# Patient Record
Sex: FEMALE | Race: WHITE | URBAN - METROPOLITAN AREA
[De-identification: names, ages, dates, MRNs, and addresses within clinical notes are randomized per-mention and may not be internally consistent; named-entity substitution may affect disease eponyms.]

---

## 2017-01-09 ENCOUNTER — MEDICAL CORRESPONDENCE (OUTPATIENT)
Dept: HEALTH INFORMATION MANAGEMENT | Facility: CLINIC | Age: 27
End: 2017-01-09

## 2017-08-03 ENCOUNTER — TRANSFERRED RECORDS (OUTPATIENT)
Dept: HEALTH INFORMATION MANAGEMENT | Facility: CLINIC | Age: 27
End: 2017-08-03

## 2017-08-03 DIAGNOSIS — N64.52 BILATERAL NIPPLE DISCHARGE: Primary | ICD-10-CM

## 2017-08-10 DIAGNOSIS — E22.1 HYPERPROLACTINEMIA (H): Primary | ICD-10-CM

## 2017-12-30 ENCOUNTER — RADIANT APPOINTMENT (OUTPATIENT)
Dept: MRI IMAGING | Facility: CLINIC | Age: 27
End: 2017-12-30
Attending: ADVANCED PRACTICE MIDWIFE
Payer: COMMERCIAL

## 2017-12-30 DIAGNOSIS — E22.1 HYPERPROLACTINEMIA (H): ICD-10-CM

## 2017-12-30 RX ORDER — GADOBUTROL 604.72 MG/ML
10 INJECTION INTRAVENOUS ONCE
Status: COMPLETED | OUTPATIENT
Start: 2017-12-30 | End: 2017-12-30

## 2017-12-30 RX ADMIN — GADOBUTROL 9 ML: 604.72 INJECTION INTRAVENOUS at 07:28

## 2017-12-30 NOTE — DISCHARGE INSTRUCTIONS
MRI Contrast Discharge Instructions    The IV contrast you received today will pass out of your body in your  urine. This will happen in the next 24 hours. You will not feel this process.  Your urine will not change color.    Drink at least 4 extra glasses of water or juice today (unless your doctor  has restricted your fluids). This reduces the stress on your kidneys.  You may take your regular medicines.    If you are on dialysis: It is best to have dialysis today.    If you have a reaction: Most reactions happen right away. If you have  any new symptoms after leaving the hospital (such as hives or swelling),  call your hospital at the correct number below. Or call your family doctor.  If you have breathing distress or wheezing, call 911.    Special instructions: ***    I have read and understand the above information.    Signature:______________________________________ Date:___________    Staff:__________________________________________ Date:___________     Time:__________    [facility] Departments:    ___[place]: [phone]  ___[facility]: [phone]  ___[place]: [phone] ___[facility]: [phone]  ___[facility]: [phone]  ___[facility]: [phone]  ___[phone]  ___[facility]: [phone]  ___[phone]

## 2018-01-09 ENCOUNTER — MEDICAL CORRESPONDENCE (OUTPATIENT)
Dept: HEALTH INFORMATION MANAGEMENT | Facility: CLINIC | Age: 28
End: 2018-01-09

## 2018-01-09 ENCOUNTER — TRANSFERRED RECORDS (OUTPATIENT)
Dept: HEALTH INFORMATION MANAGEMENT | Facility: CLINIC | Age: 28
End: 2018-01-09

## 2018-01-09 ENCOUNTER — TELEPHONE (OUTPATIENT)
Dept: OPHTHALMOLOGY | Facility: CLINIC | Age: 28
End: 2018-01-09

## 2018-01-09 DIAGNOSIS — D49.7 PITUITARY TUMOR: ICD-10-CM

## 2018-01-09 DIAGNOSIS — D49.7 PITUITARY TUMOR: Primary | ICD-10-CM

## 2018-01-09 LAB
ANION GAP SERPL CALCULATED.3IONS-SCNC: 7 MMOL/L (ref 3–14)
BUN SERPL-MCNC: 14 MG/DL (ref 7–30)
CALCIUM SERPL-MCNC: 8.5 MG/DL (ref 8.5–10.1)
CHLORIDE SERPL-SCNC: 104 MMOL/L (ref 94–109)
CO2 SERPL-SCNC: 29 MMOL/L (ref 20–32)
CORTIS SERPL-MCNC: 3.5 UG/DL (ref 4–22)
CREAT SERPL-MCNC: 0.78 MG/DL (ref 0.52–1.04)
FSH SERPL-ACNC: 5 IU/L
GFR SERPL CREATININE-BSD FRML MDRD: 89 ML/MIN/1.7M2
GLUCOSE SERPL-MCNC: 93 MG/DL (ref 70–99)
LH SERPL-ACNC: 4.5 IU/L
POTASSIUM SERPL-SCNC: 3.7 MMOL/L (ref 3.4–5.3)
PROLACTIN SERPL-MCNC: 47 UG/L (ref 3–27)
SODIUM SERPL-SCNC: 140 MMOL/L (ref 133–144)
T4 FREE SERPL-MCNC: 0.79 NG/DL (ref 0.76–1.46)
TSH SERPL DL<=0.005 MIU/L-ACNC: 3.08 MU/L (ref 0.4–4)

## 2018-01-09 NOTE — TELEPHONE ENCOUNTER
----- Message from Steph Zamora sent at 1/9/2018 11:26 AM CST -----  Regarding: New Referral For Optic Mass  Contact: [phone]  [facility] is referring patient for an Optic Mass. Records are being faxed to you in clinic.    [facility] is requesting pt be contacted at [phone] for an appointment.    Thanks,  Steph

## 2018-01-09 NOTE — TELEPHONE ENCOUNTER
Spoke to pt  Diagnosed with pituitary tumor at [facility]   Pt being referred to neurosurgery and ophthalmology    Scheduled first available neuro-ophthalmology February 6th with Dr. Wan    No visual acuity changes and no peripheral vision changes noted by pt    Notes not available to triage at time of call.    MRI has been done per pt-- done at [facility]    Note to dr. Wan to review if needs sooner neuro appt or comprehensive evaluation prior to neuro surgery appt for baseline exam with Visual field, color vision possible imaging  No neuro-surgery appt set up as of today  Pt has direct triage number for further assistance    Reuben Ahuja RN 2:49 PM 01/09/18

## 2018-01-10 ENCOUNTER — TELEPHONE (OUTPATIENT)
Dept: ENDOCRINOLOGY | Facility: CLINIC | Age: 28
End: 2018-01-10

## 2018-01-10 ENCOUNTER — OFFICE VISIT (OUTPATIENT)
Dept: ENDOCRINOLOGY | Facility: CLINIC | Age: 28
End: 2018-01-10
Payer: COMMERCIAL

## 2018-01-10 ENCOUNTER — TELEPHONE (OUTPATIENT)
Dept: OPHTHALMOLOGY | Facility: CLINIC | Age: 28
End: 2018-01-10

## 2018-01-10 VITALS
SYSTOLIC BLOOD PRESSURE: 116 MMHG | DIASTOLIC BLOOD PRESSURE: 79 MMHG | WEIGHT: 196.6 LBS | BODY MASS INDEX: 31.6 KG/M2 | HEART RATE: 60 BPM | HEIGHT: 66 IN

## 2018-01-10 DIAGNOSIS — E23.6 MASS OF PITUITARY (H): Primary | ICD-10-CM

## 2018-01-10 LAB
ACTH PLAS-MCNC: 51 PG/ML
GH SERPL-MCNC: 0.5 UG/L (ref 0–8)

## 2018-01-10 RX ORDER — MULTIPLE VITAMINS W/ MINERALS TAB 9MG-400MCG
1 TAB ORAL DAILY
COMMUNITY
End: 2018-07-17

## 2018-01-10 ASSESSMENT — PAIN SCALES - GENERAL: PAINLEVEL: NO PAIN (0)

## 2018-01-10 NOTE — PROGRESS NOTES
Endocrinology and Diabetes Clinic Initial Visit  Date of Service: January 10, 2018    Consulting provider: Dr. Dixon    Reason for consultation: Sellar Mass with Galactorrhea     Subjective:   Aurea Prasad is a 27 year old F with no significant PMH who presents for initial evaluation of galactorrhea, an elevated Prolactin level to 47, and MRI with evidence of a 1.8cm T1 and T2 hyperintense sellar mass with evidence of mass effect on the optic chiasm.    She was initially seen by a midwife at [facility] clinic for IUD placement in July of 2017 when she told provider that she had bilateral milk discharge from nipples for about 7 months since early January. Thought it was related to improper use of her NuvaRing. Was sent for breast ultrasound which was unremarkable and prolactin level was drawn and noted to be in the 50s. Midwife ordered an MRI brain. However the patient was lost to follow up for some months despite phone calls but represented in December 2017 for her MRI which was completed on 12/30/17 with evidence of a 1.8cm sellar mass with concern for mass effect on the optic chiasm.     The patient notes that since August did not have a menstrual period till December and then again in January. Previously was having q week menstrual periods with 3-4 days of bleeding. Did gain about 15 lbs around Jan 2017 but has had a stable weight since then. Appetite is stable. Energy mildly low but notes that her depression is also somewhat worse over the past year. Able to still play volleyball and walk up stairs. No excessive somnolence. No headaches. No visual changes - able to drive and play volleyball without trouble. Maybe some decreased night vision over the past year. No palpitations, cold or heat intolerace. No excessive thirst, no skin changes. No cp or sob. Endorses galactorrhea and irregular menstrual cycles.   Has never been pregnant and has no current plans. IUD in place. Sexually active. Does endorse  "occasional cocaine use for the past 1.5 years or so. No IVDU ever.     Current Problem List:   Patient Active Problem List   Diagnosis     Bilateral nipple discharge       Past Medical and Past Surgical History:  MDD not on medication    No significant PSH     Medications:   Per patient Copper IUD  Vit D  Vit E  Multivitamin    No herbal supplements    Allergies:   NKDA    Social History:  Denies tobacco use. Endorses 5-6 alcoholic drinks q week. Cocaine snorted about 1x/month for the past 1.5 years.     Family History:  Half brother with Psoriasis and Bipolar Disease. Otherwise negative. No endocrine disease in the family including DM.     Review of Systems:  A 10-point ROS is otherwise negative except as noted in HPI.     Physical Examination:  Vital signs:   /79  Pulse 60  Ht 1.676 m (5' 6\")  Wt 89.2 kg (196 lb 9.6 oz)  BMI 31.73 kg/m2  Estimated body mass index is 31.73 kg/(m^2) as calculated from the following:    Height as of this encounter: 1.676 m (5' 6\").    Weight as of this encounter: 89.2 kg (196 lb 9.6 oz).  GEN: generally, well appearing.  HEENT: EOMI. PERRLA; Visual fields including the periphery intact  NECK: Thyroid non-palpable, non-tender. No cervical or clavicular LAD.   CV: RRR with no murmur.   RESP: CTA bilaterally.   CHEST: Easily expressible milky discharge from bilateral nipples.   ABD: Soft, non-tender, and non-distended, with normal BS.   EXT: No peripheral edema.   SKIN: Normal skin temperature and turgor with no lesions.   NEURO: Non-focal. NO tremor. Normal reflexes.   PSYCH; Denies SI/HI/AH/VH    Labs and Studies:     Ref. Range 1/9/2018 19:00 1/9/2018 19:01 1/12/2018 07:30 1/12/2018 07:31   Sodium Latest Ref Range: 133 - 144 mmol/L  140     Potassium Latest Ref Range: 3.4 - 5.3 mmol/L  3.7     Chloride Latest Ref Range: 94 - 109 mmol/L  104     Carbon Dioxide Latest Ref Range: 20 - 32 mmol/L  29     Urea Nitrogen Latest Ref Range: 7 - 30 mg/dL  14     Creatinine Latest Ref " Range: 0.52 - 1.04 mg/dL  0.78     GFR Estimate Latest Ref Range: >60 mL/min/1.7m2  89     GFR Estimate If Black Latest Ref Range: >60 mL/min/1.7m2  >90     Calcium Latest Ref Range: 8.5 - 10.1 mg/dL  8.5     Anion Gap Latest Ref Range: 3 - 14 mmol/L  7     Adrenal Corticotropin Latest Ref Range: <47 pg/mL  51 (H)     Cortisol Serum Latest Ref Range: 4 - 22 ug/dL 3.5 (L)  11.6    FSH Latest Units: IU/L  5.0     Growth Hormone Latest Ref Range: 0 - 8.0 ug/L  0.5     Prolactin Latest Ref Range: 3 - 27 ug/L  47 (H)  47 (H)   T4 Free Latest Ref Range: 0.76 - 1.46 ng/dL  0.79     TSH Latest Ref Range: 0.40 - 4.00 mU/L  3.08     Glucose Latest Ref Range: 70 - 99 mg/dL  93     Ins Growth Factor 1 Latest Ref Range: 98 - 305 ng/ml  210     Lutropin Latest Units: IU/L  4.5       Imaging  MR Brain 12/30/17  Impression: T1 and T2 hyperintense sellar mass with suprasellar  extension measuring up to 1.8 cm in maximum height with mass effect on  the optic chiasm. There is adjacent enhancement on the left which  could represent residual pituitary gland versus other etiology. 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      Assessment:  26 y/o F presenting with 1-year history of bilateral galactorrhea with evidence of 1.8cm T1 and T2 hyperintense sellar mass on MRI with evidence of mass effect on optic chiasm with mildly elevated prolactin likely 2/2 stalk effect.     This is less likely to be a prolactinoma given the mildly elevated prolactin in contrast to the large sellar mass. Mild elevation of prolactin likely represents hook effect - ie her prolactin is so high that the assay is saturated and reads a low prolactin level. To rule this out, we will obtain a prolactin dilutional study which it did confirm the similar result.     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 Reviewed film with  neurosurgeon, . Her HPA axis is largely preserved at this time with the exception of the mildly elevated prolactin, and a slightly low cortisol at 3.5 and high ACTH at 51. She has no clinical symptoms of adrenal insufficiency and this lab was drawn at 7pm. We can repeat this lab at 8am in the next couple days to decide if we will need to pursue further workup with a cosyntropin stim etc. The most concerning feature is the mass effect seen on the optic chiasm. She does not have any visual field deficits. She will be seeing neuro ophthalmology on February 6. We will refer her to meet with our pituitary surgeon, Dr. Oshea.      Plan:   1) Recheck ACTH and Cortisol in the AM along with diluted Prolactin Level to rule out Hook Effect  2) Neurosurgery consultation with Dr. Oshea  3) Neuro-ophthalmologic evaluation    Patient was seen and examined with attending physician, Dr. Christiano Ha    Addendum:  Repeat prolactin level -- no hook effect   AM cortisol is 11.6 -- unlikely for adrenal insufficiency. Will not pursue with stim test    Brittno Dixon MD  IM PGY2  [phone]     ATTENDING NOTE   I have seen and examined the patient, reviewed and edited the resident's note, and agree with the plan of care.    Christiano Ha MD    Division of Diabetes and Endocrinology  Department of Medicine  [phone]

## 2018-01-10 NOTE — TELEPHONE ENCOUNTER
----- Message from Ricardo Jewell MD sent at 1/9/2018  9:46 PM CST -----  Regarding: RE: Scheduling?  I got a call from our resident who saw her in primary care clinic today. She has galactorrhea and MRI showed pituitary adenoma with optic chiasm compression and will refer to our clinic. I believed she also got urgent neurosurgery and ophtho referral.  I recommend labs and so we decided to have she has it done here. Her cortisol return low. Could you schedule her for Wednesday 1/10/18 anytime from 1 pm to 3 pm to see as an add-on with me and Dr. Alvarado?    Thank you,    Ricardo     ----- Message -----     From: Marielena Rapp     Sent: 1/9/2018   1:13 PM       To: Ricardo Jewell MD  Subject: Scheduling?                                      PADMAJA Silva received records on this patient today. I see that you have ordered labs for her. Is she aware that she needs this blood draw, and do we need to get her scheduled with Dr. Aldana for a pituitary consult? Please advise.     Marielena

## 2018-01-10 NOTE — TELEPHONE ENCOUNTER
----- Message from Francisco Sanderson sent at 1/10/2018  1:27 PM CST -----  Regarding: Guillermo, from [facility] called  Contact: [phone]  Guillermo (Female) called asking if we'd scheduled an appt for pt. Wasn't sure with which provider.    I read the notes in Epic, and saw that Nurse Reuben had made the appts. Guillermo asked if Nurse Reuben had spoken w/ Pt when making appt.    Please call Guillermo to discuss at [phone]    Thank you,  Aaron  Call Center    Please DO NOT send message and or reply back to sender. Call center Representatives DO NOT respond to Messages.  
Left message with [facility] referral  I did speak with pt yesterday  Pt has appt February 6th with dr. Wan  Pt has direct triage number if needs sooner appt per neuro surgery/endocrinology  Note was sent to dr. Wan also  Reuben Ahuja RN 1:57 PM 01/10/18      
Myself

## 2018-01-10 NOTE — TELEPHONE ENCOUNTER
To schedulers: Please schedule her for consult service as directed below.   Tasia Araya MD  Endocrine triage

## 2018-01-10 NOTE — PROGRESS NOTES
Got a call from PCP this morning about pt 26 yo female with galactorrhea and newly diagnosed 1.8 cm sellar mass and optic nerve compression. Prolactin was 56 and TSH was normal in August. She is also getting urgent ophthalmology and neurosurgery consult.    Discussed with Dr. Alvarado, agree with urgent ophthalmology consult and we recommended getting pituitary hormone and have her seen in our clinic. After discuss with calling physician and the patient, preferred to have [place] [facility]    Some of her pituitary labs return including cortisol of 3.5. I called the patient and let her know that we would like to see her in clinic tomorrow. Pt is feeling tired but no dizziness/ nausea/ vomiting. No fever/ no acute illness. No symptoms that change from her baseline. Her BP yesterday was 113/63 per her report.  She is available at 3 pm. I also talked to her over the phone briefly about cortisol and let her know that it is a stress hormone which she may need replacement and just in case if tonight she feels poor/ tired than usual/ dizziness/ nausea/ vomiting or different than her baseline, she need to seek medical attention right away.    Ricardo Jewell MD  Endocrine Fellow  [phone]

## 2018-01-10 NOTE — LETTER
1/10/2018       RE: Aurea Prasad  [address] [address]     Dear Colleague,    Thank you for referring your patient, Aurea Prasad, to the [facility] ENDOCRINOLOGY at [facility]. Please see a copy of my visit note below.    Endocrinology and Diabetes Clinic Initial Visit  Date of Service: January 10, 2018    Consulting provider: Dr. Dixon    Reason for consultation: Sellar Mass with Galactorrhea     Subjective:   Aurea Prasad is a 27 year old F with no significant PMH who presents for initial evaluation of galactorrhea, an elevated Prolactin level to 47, and MRI with evidence of a 1.8cm T1 and T2 hyperintense sellar mass with evidence of mass effect on the optic chiasm.    She was initially seen by a midwife at [facility] clinic for IUD placement in July of 2017 when she told provider that she had bilateral milk discharge from nipples for about 7 months since early January. Thought it was related to improper use of her NuvaRing. Was sent for breast ultrasound which was unremarkable and prolactin level was drawn and noted to be in the 50s. Midwife ordered an MRI brain. However the patient was lost to follow up for some months despite phone calls but represented in December 2017 for her MRI which was completed on 12/30/17 with evidence of a 1.8cm sellar mass with concern for mass effect on the optic chiasm.     The patient notes that since August did not have a menstrual period till December and then again in January. Previously was having q week menstrual periods with 3-4 days of bleeding. Did gain about 15 lbs around Jan 2017 but has had a stable weight since then. Appetite is stable. Energy mildly low but notes that her depression is also somewhat worse over the past year. Able to still play volleyball and walk up stairs. No excessive somnolence. No headaches. No visual changes - able to drive and play volleyball without trouble. Maybe some decreased  "night vision over the past year. No palpitations, cold or heat intolerace. No excessive thirst, no skin changes. No cp or sob. Endorses galactorrhea and irregular menstrual cycles.   Has never been pregnant and has no current plans. IUD in place. Sexually active. Does endorse occasional cocaine use for the past 1.5 years or so. No IVDU ever.     Current Problem List:   Patient Active Problem List   Diagnosis     Bilateral nipple discharge       Past Medical and Past Surgical History:  MDD not on medication    No significant PSH     Medications:   Per patient Copper IUD  Vit D  Vit E  Multivitamin    No herbal supplements    Allergies:   NKDA    Social History:  Denies tobacco use. Endorses 5-6 alcoholic drinks q week. Cocaine snorted about 1x/month for the past 1.5 years.     Family History:  Half brother with Psoriasis and Bipolar Disease. Otherwise negative. No endocrine disease in the family including DM.     Review of Systems:  A 10-point ROS is otherwise negative except as noted in HPI.     Physical Examination:  Vital signs:   /79  Pulse 60  Ht 1.676 m (5' 6\")  Wt 89.2 kg (196 lb 9.6 oz)  BMI 31.73 kg/m2  Estimated body mass index is 31.73 kg/(m^2) as calculated from the following:    Height as of this encounter: 1.676 m (5' 6\").    Weight as of this encounter: 89.2 kg (196 lb 9.6 oz).  GEN: generally, well appearing.  HEENT: EOMI. PERRLA; Visual fields including the periphery intact  NECK: Thyroid non-palpable, non-tender. No cervical or clavicular LAD.   CV: RRR with no murmur.   RESP: CTA bilaterally.   CHEST: Easily expressible milky discharge from bilateral nipples.   ABD: Soft, non-tender, and non-distended, with normal BS.   EXT: No peripheral edema.   SKIN: Normal skin temperature and turgor with no lesions.   NEURO: Non-focal. NO tremor. Normal reflexes.   PSYCH; Denies SI/HI/AH/VH    Labs and Studies:     Ref. Range 1/9/2018 19:00 1/9/2018 19:01 1/12/2018 07:30 1/12/2018 07:31   Sodium " Latest Ref Range: 133 - 144 mmol/L  140     Potassium Latest Ref Range: 3.4 - 5.3 mmol/L  3.7     Chloride Latest Ref Range: 94 - 109 mmol/L  104     Carbon Dioxide Latest Ref Range: 20 - 32 mmol/L  29     Urea Nitrogen Latest Ref Range: 7 - 30 mg/dL  14     Creatinine Latest Ref Range: 0.52 - 1.04 mg/dL  0.78     GFR Estimate Latest Ref Range: >60 mL/min/1.7m2  89     GFR Estimate If Black Latest Ref Range: >60 mL/min/1.7m2  >90     Calcium Latest Ref Range: 8.5 - 10.1 mg/dL  8.5     Anion Gap Latest Ref Range: 3 - 14 mmol/L  7     Adrenal Corticotropin Latest Ref Range: <47 pg/mL  51 (H)     Cortisol Serum Latest Ref Range: 4 - 22 ug/dL 3.5 (L)  11.6    FSH Latest Units: IU/L  5.0     Growth Hormone Latest Ref Range: 0 - 8.0 ug/L  0.5     Prolactin Latest Ref Range: 3 - 27 ug/L  47 (H)  47 (H)   T4 Free Latest Ref Range: 0.76 - 1.46 ng/dL  0.79     TSH Latest Ref Range: 0.40 - 4.00 mU/L  3.08     Glucose Latest Ref Range: 70 - 99 mg/dL  93     Ins Growth Factor 1 Latest Ref Range: 98 - 305 ng/ml  210     Lutropin Latest Units: IU/L  4.5       Imaging  MR Brain 12/30/17  Impression: T1 and T2 hyperintense sellar mass with suprasellar  extension measuring up to 1.8 cm in maximum height with mass effect on  the optic chiasm. There is adjacent enhancement on the left which  could represent residual pituitary gland versus other etiology. 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      Assessment:  26 y/o F presenting with 1-year history of bilateral galactorrhea with evidence of 1.8cm T1 and T2 hyperintense sellar mass on MRI with evidence of mass effect on optic chiasm with mildly elevated prolactin likely 2/2 stalk effect.     This is less likely to be a prolactinoma given the mildly elevated prolactin in contrast to the large sellar mass. Mild elevation of prolactin likely represents hook effect - ie her prolactin is  so high that the assay is saturated and reads a low prolactin level. To rule this out, we will obtain a prolactin dilutional study which it did confirm the similar result.     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 Reviewed film with neurosurgeon, . Her HPA axis is largely preserved at this time with the exception of the mildly elevated prolactin, and a slightly low cortisol at 3.5 and high ACTH at 51. She has no clinical symptoms of adrenal insufficiency and this lab was drawn at 7pm. We can repeat this lab at 8am in the next couple days to decide if we will need to pursue further workup with a cosyntropin stim etc. The most concerning feature is the mass effect seen on the optic chiasm. She does not have any visual field deficits. She will be seeing neuro ophthalmology on February 6. We will refer her to meet with our pituitary surgeon, Dr. Oshea.      Plan:   1) Recheck ACTH and Cortisol in the AM along with diluted Prolactin Level to rule out Hook Effect  2) Neurosurgery consultation with Dr. Oshea  3) Neuro-ophthalmologic evaluation    Patient was seen and examined with attending physician, Dr. Christiano Ha    Addendum:  Repeat prolactin level -- no hook effect   AM cortisol is 11.6 -- unlikely for adrenal insufficiency. Will not pursue with stim test    Britton Dixon MD  IM PGY2  [phone]     ATTENDING NOTE   I have seen and examined the patient, reviewed and edited the resident's note, and agree with the plan of care.    Christiano Ha MD    Division of Diabetes and Endocrinology  Department of Medicine  [phone]

## 2018-01-10 NOTE — MR AVS SNAPSHOT
After Visit Summary   1/10/2018    Aurea Prasad    MRN: [id number]           Patient Information     Date Of Birth          1990        Visit Information        Provider Department      1/10/2018 3:00 PM Christiano Ha MD [facility] Endocrinology        Care Instructions    Morning labs at 8 AM  Please contact us if any questions [phone]          Follow-ups after your visit        Your next 10 appointments already scheduled     2018  9:30 AM CST   NEW NEURO with Layo Wan MD   Eye Clinic ([facility])    [address]  [address]  9th Fl Clin 9a  [facility] [id number] [phone]              Who to contact     Please call your clinic at [phone] to:    Ask questions about your health    Make or cancel appointments    Discuss your medicines    Learn about your test results    Speak to your doctor   If you have compliments or concerns about an experience at your clinic, or if you wish to file a complaint, please contact [facility] Patient Relations at [phone] or email us at [email or web address]         Additional Information About Your Visit        MyChart Information     BalaBitt is an electronic gateway that provides easy, online access to your medical records. With [employer], you can request a clinic appointment, read your test results, renew a prescription or communicate with your care team.     To sign up for BalaBitt visit the website at [email or web address]   You will be asked to enter the access code listed below, as well as some personal information. Please follow the directions to create your username and password.     Your access code is: [email or web address]  Expires: 3/21/2018  6:30 AM     Your access code will  in 90 days. If you need help or a new code, please contact your [facility] or call [phone] for assistance.        Care EveryWhere ID  "    This is your Care EveryWhere ID. This could be used by other organizations to access your [place] medical records  [id number]        Your Vitals Were     Pulse Height BMI (Body Mass Index)             60 1.676 m (5' 6\") 31.73 kg/m2          Blood Pressure from Last 3 Encounters:   01/10/18 116/79    Weight from Last 3 Encounters:   01/10/18 89.2 kg (196 lb 9.6 oz)              Today, you had the following     No orders found for display       Primary Care Provider Office Phone # Fax #    Clinic [facility] [phone]       [address] [facility] [address]        Equal Access to Services     DONELL GARZON : Hadii damian ku hadasho Sojaquanali, waaxda luqadaha, qaybta kaalmada adeegyada, elidia sierra . So [facility] [phone].    ATENCIÓN: Si habla español, tiene a cheek disposición servicios gratuitos de asistencia lingüística. [facility] [phone].    We comply with applicable federal civil rights laws and [place] laws. We do not discriminate on the basis of race, color, national origin, age, disability, sex, sexual orientation, or gender identity.            Thank you!     Thank you for choosing [facility]  for your care. Our goal is always to provide you with excellent care. Hearing back from our patients is one way we can continue to improve our services. Please take a few minutes to complete the written survey that you may receive in the mail after your visit with us. Thank you!             Your Updated Medication List - Protect others around you: Learn how to safely use, store and throw away your medicines at www.disposemymeds.org.          This list is accurate as of: 1/10/18  4:08 PM.  Always use your most recent med list.                   Brand Name Dispense Instructions for use Diagnosis    Multi-vitamin Tabs tablet      Take 1 tablet by mouth daily          "

## 2018-01-12 ENCOUNTER — OFFICE VISIT (OUTPATIENT)
Dept: NEUROSURGERY | Facility: CLINIC | Age: 28
End: 2018-01-12
Payer: COMMERCIAL

## 2018-01-12 VITALS
DIASTOLIC BLOOD PRESSURE: 73 MMHG | HEIGHT: 66 IN | BODY MASS INDEX: 31.39 KG/M2 | HEART RATE: 57 BPM | WEIGHT: 195.3 LBS | SYSTOLIC BLOOD PRESSURE: 118 MMHG

## 2018-01-12 DIAGNOSIS — E23.6 RATHKE'S CLEFT CYST (H): Primary | ICD-10-CM

## 2018-01-12 DIAGNOSIS — E23.6 MASS OF PITUITARY (H): ICD-10-CM

## 2018-01-12 LAB
CORTIS SERPL-MCNC: 11.6 UG/DL (ref 4–22)
IGF-I BLD-MCNC: 210 NG/ML (ref 98–305)
PROLACTIN SERPL-MCNC: 47 UG/L (ref 3–27)

## 2018-01-12 ASSESSMENT — ENCOUNTER SYMPTOMS
SORE THROAT: 0
HOT FLASHES: 0
HOARSE VOICE: 1
MYALGIAS: 1
SINUS CONGESTION: 1
SLEEP DISTURBANCES DUE TO BREATHING: 0
BACK PAIN: 1
LIGHT-HEADEDNESS: 0
SINUS PAIN: 0
TROUBLE SWALLOWING: 0
HYPOTENSION: 0
SYNCOPE: 0
LEG PAIN: 0
MUSCLE WEAKNESS: 0
ORTHOPNEA: 0
EXERCISE INTOLERANCE: 0
DECREASED CONCENTRATION: 1
MUSCLE CRAMPS: 1
DECREASED LIBIDO: 0
INSOMNIA: 0
PALPITATIONS: 0
SMELL DISTURBANCE: 0
NECK MASS: 0
STIFFNESS: 0
PANIC: 0
TASTE DISTURBANCE: 0
DEPRESSION: 1
ARTHRALGIAS: 0
NERVOUS/ANXIOUS: 1
JOINT SWELLING: 0
NECK PAIN: 1
HYPERTENSION: 0

## 2018-01-12 ASSESSMENT — PAIN SCALES - GENERAL: PAINLEVEL: NO PAIN (0)

## 2018-01-12 NOTE — LETTER
1/12/2018       RE: Aurea Prasad  [address]  [facility] [address]     Dear Colleague,    Thank you for referring your patient, Aurea Prasad, to the [facility] NEUROSURGERY at [facility]. Please see a copy of my visit note below.    See dictated note.    January 12, 2018            Christiano Ha MD   [facility]    [address] [facility] [address]   [place] [address]      RE:  Aurea Prasad      Dear Dr. Ha:      We saw Ms. Prasad in Pituitary Clinic today for evaluation of a cystic sellar mass.  We have reviewed your notes, and you and I have already discussed her case.  In summary, she is a 27-year-old right-handed woman who presented to you with about 1 year of galactorrhea bilaterally.  You have already detailed these symptoms in your note.  An elevated prolactin level was found which led to an MRI.  This MRI was completed recently and showed a cystic mass with mild mass effect on the optic apparatus.  Her galactorrhea is bilateral and must be expressed.  She had a 5-month interval with no period but then it resumed this month.  She denies any headaches or visual changes.  Her other symptom is fatigue.      Past Medical History:     1.  Closed head injury at age 8 following a sledding collision.     2.  She was hospitalized for dehydration secondary to gastroenteritis a few years ago.   3.  [place] teeth extraction.      Family History:  There is no known history of intracranial tumors.      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  She is single and has no children.  She lives with her brother.  She does not smoke.  She has a few alcohol drinks every week.  According to your note, she has used cocaine in the past.      On exam, her general appearance is healthy.  She is obese with a BMI of 31.5.  Blood pressure is 118/73, heart rate 57.  She appears fatigued but has a normal  affect.  Her speech and language are normal.  Extraocular movements are normal.  There is no ptosis.  Pupils are equal and reactive.  Face is symmetric.  She moves all extremities with good strength and coordination.      Review of Studies:  We went over her MRI with her.  She has a nonenhancing cystic mass in the sella with suprasellar extension.  There is contact with the optic chiasm, but there does not appear to be compression.  The pituitary gland appears displaced superiorly and to the left.  The mass is hyperintense on T1 and the FLAIR sequence.  There is expansion of the floor of the sella.  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  A cystic pituitary adenoma is the other possibility.  Her repeat prolactin level was 47.  Her evening cortisol level was 3.5, and her daytime cortisol level was in the reference range at 11.6.      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  Her mild hyperprolactinemia is secondary to compression of the pituitary stalk.  While the hyperprolactinemia can be treated medically, we should give consideration to surgical drainage/resection of this cyst.      She is young, and with the expected growth of this cyst, we can expect sheyla compression of the optic apparatus eventually.  Therefore, we believe that surgical decompression is best.  This will likely also relieve the hyperprolactinemia.  In the absence of other symptoms, surgery can be performed at any time.  She is scheduled for formal visual testing in about 3 weeks, and we will await those results.  In the meantime, we will go ahead and schedule her for the endoscopic transnasal approach.  We went over the details of surgery including the risks of carotid artery injury, CSF leak, infection, hemorrhage and panhypopituitarism.  Given her young age, our approach will be conservative, and we will focus on drainage  of this cyst with resection of the readily accessible parts of the cyst wall.  However, conservative approach increases the risk of cyst recurrence and need for reoperation.  She is aware of all of this and is leaning towards surgery.  We will arrange for her to meet with one of our ENT Skull Base Surgery partners, and we will begin making arrangements.        We will keep you informed of her progress.         BRI BABB MD             D: 2018 13:27   T: 01/15/2018 08:41   MT: PABLO      Name:     SIERRA BOWER   MRN:      [id number]        Account:      [id number]   :      1990           Service Date: 2018      Document: [id number]       Again, thank you for allowing me to participate in the care of your patient.      Sincerely,    Bri Babb MD

## 2018-01-12 NOTE — MR AVS SNAPSHOT
After Visit Summary   1/12/2018    Aurea Prasad    MRN: [id number]           Patient Information     Date Of Birth          1990        Visit Information        Provider Department      1/12/2018 10:30 AM Maria Luisa Oshea MD [facility] Neurosurgery        Today's Diagnoses     Rathke's cleft cyst (H)    -  1       Follow-ups after your visit        Additional Services     Otolaryngology Referral [2710]       Your provider has referred you to: [facility]: Adult Ear, Nose and Throat Clinic (Otolaryngology) [facility] [phone]  [email or web address]  For Dr Lewis or Yarely - surgical consultation    Please be aware that coverage of these services is subject to the terms and limitations of your health insurance plan.  Call member services at your health plan with any benefit or coverage questions.      Please bring the following with you to your appointment:    (1) Any X-Rays, CTs or MRIs which have been performed.  Contact the facility where they were done to arrange for  prior to your scheduled appointment.   (2) List of current medications  (3) This referral request   (4) Any documents/labs given to you for this referral            PAC Visit Referral (For [facility] Only)       Does this visit require an Anesthesia consult?  Yes - Evaluate for medical necessity related to one of the following conditions:  Age and general health    H&P done by:  N/A and Other (Specify): PAC      Please be aware that coverage of these services is subject to the terms and limitations of your health insurance plan.  Call member services at your health plan with any benefit or coverage questions.      Please bring the following to your appointment:  >>   Any x-rays, CTs or MRIs which have been performed.  Contact the facility where they were done to arrange for  prior to your scheduled appointment.  Any new CT, MRI or other procedures ordered by your  specialist must be performed at a [facility] or coordinated by your clinic's referral office.    >>   List of current medications  >>   This referral request   >>   Any documents/labs given to you for this referral                  Your next 10 appointments already scheduled     2018  9:30 AM CST   NEW NEURO with Layo Wan MD   Eye Clinic ([facility])    [address]  [address] [address]  9th Fl Clin 9a  [facility] [id number] [phone]              Future tests that were ordered for you today     Open Future Orders        Priority Expected Expires Ordered    MRI Brain with & without gadolinium with pituitary protocol [ANY057] Routine  2019    CT Head without contrast [GTG672] Routine  2019            Who to contact     Please call your clinic at [phone] to:    Ask questions about your health    Make or cancel appointments    Discuss your medicines    Learn about your test results    Speak to your doctor   If you have compliments or concerns about an experience at your clinic, or if you wish to file a complaint, please contact [facility] Patient Relations at [phone] or email us at [email or web address]         Additional Information About Your Visit        Matatena GamesharNavita Information     Prot-Ont is an electronic gateway that provides easy, online access to your medical records. With Patient Access [employer], you can request a clinic appointment, read your test results, renew a prescription or communicate with your care team.     To sign up for Prot-Ont visit the website at [email or web address]   You will be asked to enter the access code listed below, as well as some personal information. Please follow the directions to create your username and password.     Your access code is: [email or web address]  Expires: 3/21/2018  6:30 AM     Your access code will  in 90 days. If you need help or a new code, please  "contact your [facility] Clinic or call [phone] for assistance.        Care EveryWhere ID     This is your Care EveryWhere ID. This could be used by other organizations to access your [place] medical records  [id number]        Your Vitals Were     Pulse Height BMI (Body Mass Index)             57 1.676 m (5' 6\") 31.52 kg/m2          Blood Pressure from Last 3 Encounters:   01/12/18 118/73   01/10/18 116/79    Weight from Last 3 Encounters:   01/12/18 88.6 kg (195 lb 4.8 oz)   01/10/18 89.2 kg (196 lb 9.6 oz)              We Performed the Following     Otolaryngology Referral [8528]     PAC Visit Referral (For [facility] Only)     Mavis-Operative Worksheet (Neuro - Single Procedure)        Primary Care Provider Office Phone # Fax #    [facility] [phone]       [address] [address]        Equal Access to Services     DONELL GARZON AH: Hadii aad ku hadasho Soomaali, waaxda luqadaha, qaybta kaalmada adeegyada, waxay idiin hayaan ray kharakasie ladenia . So [facility] [phone].    ATENCIÓN: Si habla español, tiene a cheek disposición servicios gratuitos de asistencia lingüística. Llame al [phone].    We comply with applicable federal civil rights laws and [place] laws. We do not discriminate on the basis of race, color, national origin, age, disability, sex, sexual orientation, or gender identity.            Thank you!     Thank you for choosing [facility] NEUROSURGERY  for your care. Our goal is always to provide you with excellent care. Hearing back from our patients is one way we can continue to improve our services. Please take a few minutes to complete the written survey that you may receive in the mail after your visit with us. Thank you!             Your Updated Medication List - Protect others around you: Learn how to safely use, store and throw away your medicines at www.disposemymeds.org.          This list is accurate as of: 1/12/18  1:28 PM.  " Always use your most recent med list.                   Brand Name Dispense Instructions for use Diagnosis    Multi-vitamin Tabs tablet      Take 1 tablet by mouth daily

## 2018-01-12 NOTE — LETTER
1/12/2018      RE: Aurea LUCIANO Osmar  [address]  [place] [address]       See dictated note.    January 12, 2018            Christiano Ha MD   [facility]    [address]   [place] [address]      RE:  Aurea Prasad      Dear Dr. Ha:      We saw Ms. Prasad in Pituitary Clinic today for evaluation of a cystic sellar mass.  We have reviewed your notes, and you and I have already discussed her case.  In summary, she is a 27-year-old right-handed woman who presented to you with about 1 year of galactorrhea bilaterally.  You have already detailed these symptoms in your note.  An elevated prolactin level was found which led to an MRI.  This MRI was completed recently and showed a cystic mass with mild mass effect on the optic apparatus.  Her galactorrhea is bilateral and must be expressed.  She had a 5-month interval with no period but then it resumed this month.  She denies any headaches or visual changes.  Her other symptom is fatigue.      Past Medical History:     1.  Closed head injury at age 8 following a sledding collision.     2.  She was hospitalized for dehydration secondary to gastroenteritis a few years ago.   3.  [place] teeth extraction.      Family History:  There is no known history of intracranial tumors.      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  She is single and has no children.  She lives with her brother.  She does not smoke.  She has a few alcohol drinks every week.  According to your note, she has used cocaine in the past.      On exam, her general appearance is healthy.  She is obese with a BMI of 31.5.  Blood pressure is 118/73, heart rate 57.  She appears fatigued but has a normal affect.  Her speech and language are normal.  Extraocular movements are normal.  There is no ptosis.  Pupils are equal and reactive.  Face is symmetric.  She moves all extremities with good strength  and coordination.      Review of Studies:  We went over her MRI with her.  She has a nonenhancing cystic mass in the sella with suprasellar extension.  There is contact with the optic chiasm, but there does not appear to be compression.  The pituitary gland appears displaced superiorly and to the left.  The mass is hyperintense on T1 and the FLAIR sequence.  There is expansion of the floor of the sella.  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  A cystic pituitary adenoma is the other possibility.  Her repeat prolactin level was 47.  Her evening cortisol level was 3.5, and her daytime cortisol level was in the reference range at 11.6.      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  Her mild hyperprolactinemia is secondary to compression of the pituitary stalk.  While the hyperprolactinemia can be treated medically, we should give consideration to surgical drainage/resection of this cyst.      She is young, and with the expected growth of this cyst, we can expect sheyla compression of the optic apparatus eventually.  Therefore, we believe that surgical decompression is best.  This will likely also relieve the hyperprolactinemia.  In the absence of other symptoms, surgery can be performed at any time.  She is scheduled for formal visual testing in about 3 weeks, and we will await those results.  In the meantime, we will go ahead and schedule her for the endoscopic transnasal approach.  We went over the details of surgery including the risks of carotid artery injury, CSF leak, infection, hemorrhage and panhypopituitarism.  Given her young age, our approach will be conservative, and we will focus on drainage of this cyst with resection of the readily accessible parts of the cyst wall.  However, conservative approach increases the risk of cyst recurrence and need for reoperation.  She is aware of all of  this and is leaning towards surgery.  We will arrange for her to meet with one of our ENT Skull Base Surgery partners, and we will begin making arrangements.        We will keep you informed of her progress.         BRI BABB MD             D: 2018 13:27   T: 01/15/2018 08:41   MT: PABLO      Name:     SIERRA BOWER   MRN:      [id number]        Account:      [id number]   :      1990           Service Date: 2018      Document: [id number]       Bri Babb MD

## 2018-01-15 LAB — ACTH PLAS-MCNC: 65 PG/ML

## 2018-01-30 DIAGNOSIS — H53.10 SUBJECTIVE VISION DISTURBANCE: Primary | ICD-10-CM

## 2018-02-06 ENCOUNTER — OFFICE VISIT (OUTPATIENT)
Dept: OPHTHALMOLOGY | Facility: CLINIC | Age: 28
End: 2018-02-06
Attending: OPHTHALMOLOGY
Payer: COMMERCIAL

## 2018-02-06 DIAGNOSIS — H53.10 SUBJECTIVE VISION DISTURBANCE: ICD-10-CM

## 2018-02-06 DIAGNOSIS — D49.6 BRAIN TUMOR (H): Primary | ICD-10-CM

## 2018-02-06 PROCEDURE — G0463 HOSPITAL OUTPT CLINIC VISIT: HCPCS | Mod: ZF

## 2018-02-06 PROCEDURE — 92083 EXTENDED VISUAL FIELD XM: CPT | Mod: ZF | Performed by: OPHTHALMOLOGY

## 2018-02-06 PROCEDURE — 92133 CPTRZD OPH DX IMG PST SGM ON: CPT | Mod: ZF | Performed by: OPHTHALMOLOGY

## 2018-02-06 ASSESSMENT — VISUAL ACUITY
OD_SC+: -2
METHOD: SNELLEN - LINEAR
OS_SC: 20/20
OS_SC+: -2
OD_SC: 20/20

## 2018-02-06 ASSESSMENT — TONOMETRY
OS_IOP_MMHG: 18
OD_IOP_MMHG: 16
IOP_METHOD: TONOPEN

## 2018-02-06 ASSESSMENT — CUP TO DISC RATIO
OS_RATIO: 0.2
OD_RATIO: 0.15

## 2018-02-06 ASSESSMENT — CONF VISUAL FIELD
OS_NORMAL: 1
OD_NORMAL: 1

## 2018-02-06 ASSESSMENT — SLIT LAMP EXAM - LIDS
COMMENTS: NORMAL
COMMENTS: NORMAL

## 2018-02-06 ASSESSMENT — EXTERNAL EXAM - RIGHT EYE: OD_EXAM: NORMAL

## 2018-02-06 ASSESSMENT — EXTERNAL EXAM - LEFT EYE: OS_EXAM: NORMAL

## 2018-02-06 NOTE — LETTER
2018         RE:  :  MRN: Aurea Prasad  1990  [id number]     Dear Dr. Oshea,    Thank you for asking me to see your very pleasant patient, Aurea Prasad, in neuro-ophthalmic consultation.  I would like to thank you for sending your records and I have summarized them in the history of present illness.   My assessment and plan are below.  For further details, please see my attached clinic note.          Assessment & Plan     Aurea Prasad is a 27 year old female with the following diagnoses:   1. Brain tumor (H)    2. Subjective vision disturbance       Aurea Prasad is a 27 year old female with cystic sellar mass scheduled for resection 2018 with Dr. Oshea. Identified based on galactorrhea x 1 year. Prolactin 47. She denies past ocular history. She denies vision changes, flashes, or floaters.     Visual acuity is 20/20 both eyes. There is no afferent pupillary defect. Color plates are full. There is no evidence of visual field deficit. The optic nerve is normal.     MRI reviewed by me shows a sellar cyst with mild distortion of the optic chiasm. It does not involve the cavernous sinus.      She has an incidental finding of small area of subretinal heme superotemporally in her right eye.     She should follow up with me in one year or sooner as needed if she develops vision symptoms.          Again, thank you for allowing me to participate in the care of your patient.      Sincerely,    Layo Wan MD  Professor, Neuro-Ophthalmology  Department of Ophthalmology and Visual Neurosciences  [facility]    CC: [facility]   [facility] [address]  VIA Facsimile: [phone]     Lexis Narayanan RN  VIA In Basket     Maria Luisa Oshea MD  [address] [place] [address] [id number]  [facility] [address]  VIA In Basket

## 2018-02-06 NOTE — PROGRESS NOTES
Assessment & Plan     Aurea Prasad is a 27 year old female with the following diagnoses:   1. Brain tumor (H)    2. Subjective vision disturbance       Aurea Prasad is a 27 year old female with cystic sellar mass scheduled for resection 4/2018 with Dr. Oshea. Identified based on galactorrhea x 1 year. Prolactin 47. She denies past ocular history. She denies vision changes, flashes, or floaters.     Visual acuity is 20/20 both eyes. There is no afferent pupillary defect. Color plates are full. There is no evidence of visual field deficit. The optic nerve is normal.     MRI reviewed by me shows a sellar cyst with mild distortion of the optic chiasm. It does not involve the cavernous sinus.      She has an incidental finding of small area of subretinal heme superotemporally in her right eye.     She should follow up with me in one year or sooner as needed if she develops vision symptoms.            Attending Physician Attestation:  Complete documentation of historical and exam elements from today's encounter can be found in the full encounter summary report (not reduplicated in this progress note).  I personally obtained the chief complaint(s) and history of present illness.  I confirmed and edited as necessary the review of systems, past medical/surgical history, family history, social history, and examination findings as documented by others; and I examined the patient myself.  I personally reviewed the relevant tests, images, and reports as documented above.  I formulated and edited as necessary the assessment and plan and discussed the findings and management plan with the patient and family. I personally reviewed the ophthalmic test(s) associated with this encounter, agree with the interpretation(s) as documented by the resident/fellow, and have edited the corresponding report(s) as necessary.  - Layo Wan MD

## 2018-02-06 NOTE — MR AVS SNAPSHOT
After Visit Summary   2/6/2018    Aurea Prasad    MRN: [id number]           Patient Information     Date Of Birth          1990        Visit Information        Provider Department      2/6/2018 9:30 AM [facility]o [facility]        Today's Diagnoses     Brain tumor (H)    -  1    Subjective vision disturbance           Follow-ups after your visit        Follow-up notes from your care team     Return if symptoms worsen or fail to improve.      Your next 10 appointments already scheduled     Apr 12, 2018 11:30 AM CDT   (Arrive by 11:15 AM)   PAC EVALUATION with  Pac Jennifer 3   [facility] Preoperative Assessment [place] ([facility])    [address] [address]   [phone]            Apr 12, 2018 12:30 PM CDT   (Arrive by 12:15 PM)   PAC RN ASSESSMENT with  Pac Rn   [facility] [place] ([facility])    [address] [place] [address]   [phone]            Apr 12, 2018  1:00 PM CDT   (Arrive by 12:45 PM)   PAC Anesthesia Consult with  Pac Anesthesiologist   [facility] [place] ([facility])    [address] [place] [address]   [phone]            Apr 12, 2018  1:40 PM CDT   (Arrive by 1:25 PM)   New Patient Visit with Mell Mccray MD   [facility] Ear Nose and Throat ([facility])    [address] [place] [address]   [phone]            Apr 20, 2018  7:40 AM CDT   CT HEAD W/O CONTRAST with UUCT1   [facility] [place], [place] ([facility] [place])    [address] [facility] [address] [phone]           Please bring any scans or X-rays taken at other hospitals, if similar tests were done. Also bring a list of your medicines, including vitamins, minerals and  over-the-counter drugs. It is safest to leave personal items at home.  Be sure to tell your doctor:   If you have any allergies.   If there s any chance you are pregnant.   If you are breastfeeding.   If you have any special needs.  You do not need to do anything special to prepare.  Please wear loose clothing, such as a sweat suit or jogging clothes. Avoid snaps, zippers and other metal. We may ask you to undress and put on a hospital gown.            Apr 20, 2018  8:00 AM CDT   (Arrive by 7:45 AM)   MR BRAIN W/O & W CONTRAST with UUMR2   [facility] [place], MRI ([facility])    [address] [facility] [address] [phone]           Take your medicines as usual, unless your doctor tells you not to. Bring a list of your current medicines to your exam (including vitamins, minerals and over-the-counter drugs).  You will be given intravenous contrast for this exam. To prepare:   The day before your exam, drink extra fluids at least six 8-ounce glasses (unless your doctor tells you to restrict your fluids).   Have a blood test (creatinine test) within 30 days of your exam. Go to your clinic or Diagnostic Imaging Department for this test.  The MRI machine uses a strong magnet. Please wear clothes without metal (snaps, zippers). A sweatsuit works well, or we may give you a hospital gown.  Please remove any body piercings and hair extensions before you arrive. You will also remove watches, jewelry, hairpins, wallets, dentures, partial dental plates and hearing aids. You may wear contact lenses, and you may be able to wear your rings. We have a safe place to keep your personal items, but it is safer to leave them at home.   **IMPORTANT** THE INSTRUCTIONS BELOW ARE ONLY FOR THOSE PATIENTS WHO HAVE BEEN TOLD THEY WILL RECEIVE SEDATION OR GENERAL ANESTHESIA DURING THEIR MRI PROCEDURE:  IF YOU WILL RECEIVE SEDATION (take medicine to help you relax during your  exam):   You must get the medicine from your doctor before you arrive. Bring the medicine to the exam. Do not take it at home.   Arrive one hour early. Bring someone who can take you home after the test. Your medicine will make you sleepy. After the exam, you may not drive, take a bus or take a taxi by yourself.   No eating 8 hours before your exam. You may have clear liquids up until 4 hours before your exam. (Clear liquids include water, clear tea, black coffee and fruit juice without pulp.)  IF YOU WILL RECEIVE ANESTHESIA (be asleep for your exam):   Arrive 1 1/2 hours early. Bring someone who can take you home after the test. You may not drive, take a bus or take a taxi by yourself.   No eating 8 hours before your exam. You may have clear liquids up until 4 hours before your exam. (Clear liquids include water, clear tea, black coffee and fruit juice without pulp.)  Please call the Imaging Department at your exam site with any questions.            Apr 20, 2018   Procedure with Maria Luisa Oshea MD   [facility], [place], Same Day Surgery (--)    [address] [facility] [address] [phone]            May 10, 2018  2:40 PM CDT   (Arrive by 2:25 PM)   Return Visit with Mell Mccray MD   [facility] ([facility])    [address] [facility] [id number] [phone]              Who to contact     Please call your clinic at [phone] to:    Ask questions about your health    Make or cancel appointments    Discuss your medicines    Learn about your test results    Speak to your doctor   If you have compliments or concerns about an experience at your clinic, or if you wish to file a complaint, please contact [facility] Patient Relations at [phone] or email us at [email or web address]         Additional Information About Your Visit        MyChart Information     Melvin is an  electronic gateway that provides easy, online access to your medical records. With Yabidu, you can request a clinic appointment, read your test results, renew a prescription or communicate with your care team.     To sign up for Yabidu visit the website at [email or web address]   You will be asked to enter the access code listed below, as well as some personal information. Please follow the directions to create your username and password.     Your access code is: [email or web address]  Expires: 3/21/2018  6:30 AM     Your access code will  in 90 days. If you need help or a new code, please contact your [facility] or call [phone] for assistance.        Care EveryWhere ID     This is your Care EveryWhere ID. This could be used by other organizations to access your [facility] medical records  [id number]         Blood Pressure from Last 3 Encounters:   18 118/73   01/10/18 116/79    Weight from Last 3 Encounters:   18 88.6 kg (195 lb 4.8 oz)   01/10/18 89.2 kg (196 lb 9.6 oz)              We Performed the Following     DILATED FUNDUS EXAM     Glaucoma Top OU     IOP Measurement     OCT Optic Nerve RNFL Spectralis OU (both eyes)        Primary Care Provider Office Phone # Fax #    [facility] [phone]        [facility] [address]        Equal Access to Services     DONELL GARZON AH: Hadii aad ku hadasho Soomaali, waaxda luqadaha, qaybta kaalmada adeegyada, elidia hancock haykathy sierra . So [facility] [phone].    ATENCIÓN: Si habla español, tiene a cheek disposición servicios gratuitos de asistencia lingüística. Llame al [phone].    We comply with applicable federal civil rights laws and [place] laws. We do not discriminate on the basis of race, color, national origin, age, disability, sex, sexual orientation, or gender identity.            Thank you!     Thank you for choosing [facility]  for your care. Our goal is  always to provide you with excellent care. Hearing back from our patients is one way we can continue to improve our services. Please take a few minutes to complete the written survey that you may receive in the mail after your visit with us. Thank you!             Your Updated Medication List - Protect others around you: Learn how to safely use, store and throw away your medicines at www.disposemymeds.org.          This list is accurate as of 2/6/18 11:19 AM.  Always use your most recent med list.                   Brand Name Dispense Instructions for use Diagnosis    Multi-vitamin Tabs tablet      Take 1 tablet by mouth daily

## 2018-02-06 NOTE — NURSING NOTE
Chief Complaints and History of Present Illnesses   Patient presents with     Neurologic Problem     pituitary tumor     HPI    Symptoms:              Comments:  Aurea is a 27 year old female presenting with a history of:    1. 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  - scheduled for resection 4/2018  - no vision concerns.     Tegan THOMAS 9:56 AM February 6, 2018

## 2018-03-09 NOTE — PATIENT INSTRUCTIONS
Morning labs at 8 AM  Please contact us if any questions [phone]   Secondary Intention Text (Leave Blank If You Do Not Want): The defect will heal with secondary intention.

## 2018-04-03 DIAGNOSIS — D49.6: Primary | ICD-10-CM

## 2018-04-09 ENCOUNTER — ANESTHESIA EVENT (OUTPATIENT)
Dept: SURGERY | Facility: CLINIC | Age: 28
DRG: 982 | End: 2018-04-09
Payer: COMMERCIAL

## 2018-04-09 NOTE — TELEPHONE ENCOUNTER
FUTURE VISIT INFORMATION      FUTURE VISIT INFORMATION:    Date: 4/12/18    Time: 4:40PM    Location: [facility] E[facility]  REFERRAL INFORMATION:    Referring provider:  Dr Oshea    Referring providers clinic:  [facility] Neurosurgery    Reason for visit/diagnosis  Rathke's cleft cyst surg 4/20    RECORDS STATUS    All recs with Neurosurgery - EMR

## 2018-04-11 PROBLEM — N64.3 GALACTORRHEA OF BOTH BREASTS: Status: ACTIVE | Noted: 2018-04-11

## 2018-04-12 ENCOUNTER — OFFICE VISIT (OUTPATIENT)
Dept: OTOLARYNGOLOGY | Facility: CLINIC | Age: 28
End: 2018-04-12
Payer: COMMERCIAL

## 2018-04-12 ENCOUNTER — ALLIED HEALTH/NURSE VISIT (OUTPATIENT)
Dept: SURGERY | Facility: CLINIC | Age: 28
End: 2018-04-12
Payer: COMMERCIAL

## 2018-04-12 ENCOUNTER — PRE VISIT (OUTPATIENT)
Dept: OTOLARYNGOLOGY | Facility: CLINIC | Age: 28
End: 2018-04-12

## 2018-04-12 ENCOUNTER — OFFICE VISIT (OUTPATIENT)
Dept: SURGERY | Facility: CLINIC | Age: 28
End: 2018-04-12
Payer: COMMERCIAL

## 2018-04-12 ENCOUNTER — APPOINTMENT (OUTPATIENT)
Dept: SURGERY | Facility: CLINIC | Age: 28
End: 2018-04-12
Payer: COMMERCIAL

## 2018-04-12 VITALS
SYSTOLIC BLOOD PRESSURE: 120 MMHG | HEART RATE: 66 BPM | WEIGHT: 180 LBS | DIASTOLIC BLOOD PRESSURE: 82 MMHG | BODY MASS INDEX: 28.93 KG/M2 | TEMPERATURE: 98 F | OXYGEN SATURATION: 99 % | HEIGHT: 66 IN

## 2018-04-12 VITALS — WEIGHT: 180 LBS | BODY MASS INDEX: 28.93 KG/M2 | HEIGHT: 66 IN

## 2018-04-12 DIAGNOSIS — D49.6 BRAIN TUMOR (H): ICD-10-CM

## 2018-04-12 DIAGNOSIS — Z01.818 PREOP EXAMINATION: Primary | ICD-10-CM

## 2018-04-12 DIAGNOSIS — D49.7 PITUITARY NEOPLASM: Primary | ICD-10-CM

## 2018-04-12 DIAGNOSIS — Z01.818 PREOP EXAMINATION: ICD-10-CM

## 2018-04-12 LAB
ALBUMIN UR-MCNC: NEGATIVE MG/DL
ANION GAP SERPL CALCULATED.3IONS-SCNC: 6 MMOL/L (ref 3–14)
APPEARANCE UR: CLEAR
APTT PPP: 29 SEC (ref 22–37)
BILIRUB UR QL STRIP: NEGATIVE
BUN SERPL-MCNC: 10 MG/DL (ref 7–30)
CALCIUM SERPL-MCNC: 9.3 MG/DL (ref 8.5–10.1)
CHLORIDE SERPL-SCNC: 106 MMOL/L (ref 94–109)
CO2 SERPL-SCNC: 26 MMOL/L (ref 20–32)
COLOR UR AUTO: ABNORMAL
CREAT SERPL-MCNC: 0.7 MG/DL (ref 0.52–1.04)
ERYTHROCYTE [DISTWIDTH] IN BLOOD BY AUTOMATED COUNT: 13.9 % (ref 10–15)
GFR SERPL CREATININE-BSD FRML MDRD: >90 ML/MIN/1.7M2
GLUCOSE SERPL-MCNC: 83 MG/DL (ref 70–99)
GLUCOSE UR STRIP-MCNC: NEGATIVE MG/DL
HCT VFR BLD AUTO: 42.3 % (ref 35–47)
HGB BLD-MCNC: 14.1 G/DL (ref 11.7–15.7)
HGB UR QL STRIP: ABNORMAL
INR PPP: 1.08 (ref 0.86–1.14)
KETONES UR STRIP-MCNC: NEGATIVE MG/DL
LEUKOCYTE ESTERASE UR QL STRIP: ABNORMAL
MCH RBC QN AUTO: 30.6 PG (ref 26.5–33)
MCHC RBC AUTO-ENTMCNC: 33.3 G/DL (ref 31.5–36.5)
MCV RBC AUTO: 92 FL (ref 78–100)
NITRATE UR QL: NEGATIVE
PH UR STRIP: 7 PH (ref 5–7)
PLATELET # BLD AUTO: 212 10E9/L (ref 150–450)
POTASSIUM SERPL-SCNC: 3.7 MMOL/L (ref 3.4–5.3)
RBC # BLD AUTO: 4.61 10E12/L (ref 3.8–5.2)
RBC #/AREA URNS AUTO: 2 /HPF (ref 0–2)
SODIUM SERPL-SCNC: 138 MMOL/L (ref 133–144)
SOURCE: ABNORMAL
SP GR UR STRIP: 1 (ref 1–1.03)
SQUAMOUS #/AREA URNS AUTO: <1 /HPF (ref 0–1)
UROBILINOGEN UR STRIP-MCNC: 0 MG/DL (ref 0–2)
WBC # BLD AUTO: 10.5 10E9/L (ref 4–11)
WBC #/AREA URNS AUTO: 26 /HPF (ref 0–5)

## 2018-04-12 ASSESSMENT — ENCOUNTER SYMPTOMS: ORTHOPNEA: 0

## 2018-04-12 ASSESSMENT — LIFESTYLE VARIABLES: TOBACCO_USE: 0

## 2018-04-12 ASSESSMENT — PAIN SCALES - GENERAL: PAINLEVEL: NO PAIN (0)

## 2018-04-12 NOTE — NURSING NOTE
"Chief Complaint   Patient presents with     Consult     Rathke's cleft cyst      Height 1.68 m (5' 6.14\"), weight 81.6 kg (180 lb).    Myron Diaz LPN    "

## 2018-04-12 NOTE — PATIENT INSTRUCTIONS
Preparing for Your Surgery      Name:  Aurea Prasad   MRN:  [id number]   :  1990   Today's Date:  2018     Arriving for surgery:  Surgery date:  2018  Arrival time:  12:40 pm  Please come to:       [facility] [address]  [address] [place]  [address]    -   parking is available in front of the hospital from 5:15 am to 8:00 pm    -  Stop at the Information Desk in the lobby    -   Inform the information person that you are here for surgery. An escort to 3c will be provided. If you would not like an escort, please proceed to 3C on the 3rd floor. [phone]     What can I eat or drink?       -  Please avoid aspirin, ibuprofen, vitamins, and supplements one week before your surgery date.  -  You may have solid food or milk products until 8 hours prior to your surgery or 06:40 am.  -  You may have water, apple juice or 7up/Sprite until 2 hours prior to your surgery or 12:40 pm.    Which medicines can I take?  -  Do NOT take these medications in the morning, the day of surgery:   Does not apply    -  Please take these medications the day of surgery:   Does not apply    How do I prepare myself?  -  Take two showers: one the night before surgery; and one the morning of surgery.         Use Scrubcare or Hibiclens to wash from neck down.  You may use your own shampoo and conditioner. No other hair products.   -  Do NOT use lotion, powder, deodorant, or antiperspirant the day of your surgery.  -  Do NOT wear any makeup, fingernail polish or jewelry.  -  Do not bring your own medications to the hospital, except for inhalers and eye drops.  -  Bring your ID and insurance card.    Questions or Concerns:  If you have questions or concerns regarding the day of surgery, please call the Preoperative Assessment Center (PAC), Monday-Friday 7AM-7PM:  [phone].  After surgery please call your surgeons office.           AFTER YOUR SURGERY  Breathing exercises    Breathing exercises help you recover faster. Take deep breaths and let the air out slowly. This will:     Help you wake up after surgery.    Help prevent complications like pneumonia.  Preventing complications will help you go home sooner.   We may give you a breathing device (incentive spirometer) to encourage you to breathe deeply.   Nausea and vomiting   You may feel sick to your stomach after surgery; if so, let your nurse know.    Pain control:  After surgery, you may have pain. Our goal is to help you manage your pain. Pain medicine will help you feel comfortable enough to do activities that will help you heal.  These activities may include breathing exercises, walking and physical therapy.   To help your health care team treat your pain we will ask: 1) If you have pain  2) where it is located 3) describe your pain in your words  Methods of pain control include medications given by mouth, vein or by nerve block for some surgeries.  We may give you a pain control pump that will:  1) Deliver the medicine through a tube placed in your vein  2) Control the amount of medicine you receive  3) Allow you to push a button to deliver a dose of pain medicine  Sequential Compression Device (SCD) or Pneumo Boots:  You may need to wear SCD S on your legs or feet. These are wraps connected to a machine that pumps in air and releases it. The repeated pumping helps prevent blood clots from forming.

## 2018-04-12 NOTE — PATIENT INSTRUCTIONS
1.  You were seen in the ENT Clinic today by Dr. Mccray.  If you have questions or concerns after your appointment please call, [phone].  Press option #1 for scheduling related needs.  Press option #3 for Nurse advice.  2.  Plan is to move forward with surgery as planned.  3. RN Care Coordinator is Luanne, [phone]  4. Please see the information below for after surgery expectations.  5. Remove your nasal piercing prior to surgery.      ENDOSCOPIC SINUS SURGERY PATIENT INSTRUCTIONS    1.  The typical hospital stay is 2-3 days.  On rare occasions, nasal packing will need to be used after surgery and will be removed before you go home.   2. You will have nasal splints (clear pieces of plastic) in place for 3-4 weeks after surgery. These will be removed at your first post op appointment in the ENT Clinic.  Use nasal saline as much as needed at home to keep your sinuses clear.  This will be sent home with you on discharge.  3.  Nasal precautions/activity restrictions will remain in place for 6 weeks after surgery.  No nose blowing, no lifting greater than 10 pounds, no straws when drinking liquids and bend with your knees when picking something up.  If you have to sneeze, try to open your mouth when doing so. Use stool softeners while on narcotics.  4.  Normal nasal drainage will be mucous or blood tinged. If you experience clear, watery drainage that drips consistently from your nose please call the clinic. Phone numbers are below.  5.  Other reasons to call the clinic are fever, increased pain, uncontrolled headaches or visual changes.  6. You can expect to feel congested and may not be able to breathe well through your nose until the nasal splints are removed.      Contact information during business hours is [phone]. Option #3 for the Triage Nurse.    After hours call the hospital  at [phone] and ask to speak with the Neurosurgery or ENT Resident on call.

## 2018-04-12 NOTE — MR AVS SNAPSHOT
After Visit Summary   4/12/2018    Aurea Prasad    MRN: [id number]           Patient Information     Date Of Birth          1990        Visit Information        Provider Department      4/12/2018 4:40 PM Mell Mccray MD [facility] Ear Nose and Throat        Care Instructions    1.  You were seen in the ENT Clinic today by Dr. Mccray.  If you have questions or concerns after your appointment please call, [phone].  Press option #1 for scheduling related needs.  Press option #3 for Nurse advice.  2.  Plan is to move forward with surgery as planned.  3. RN Care Coordinator is Luanne, [phone]  4. Please see the information below for after surgery expectations.  5. Remove your nasal piercing prior to surgery.      ENDOSCOPIC SINUS SURGERY PATIENT INSTRUCTIONS    1.  The typical hospital stay is 2-3 days.  On rare occasions, nasal packing will need to be used after surgery and will be removed before you go home.   2. You will have nasal splints (clear pieces of plastic) in place for 3-4 weeks after surgery. These will be removed at your first post op appointment in the ENT Clinic.  Use nasal saline as much as needed at home to keep your sinuses clear.  This will be sent home with you on discharge.  3.  Nasal precautions/activity restrictions will remain in place for 6 weeks after surgery.  No nose blowing, no lifting greater than 10 pounds, no straws when drinking liquids and bend with your knees when picking something up.  If you have to sneeze, try to open your mouth when doing so. Use stool softeners while on narcotics.  4.  Normal nasal drainage will be mucous or blood tinged. If you experience clear, watery drainage that drips consistently from your nose please call the clinic. Phone numbers are below.  5.  Other reasons to call the clinic are fever, increased pain, uncontrolled headaches or visual changes.  6. You can expect to feel congested and may not be  able to breathe well through your nose until the nasal splints are removed.      Contact information during business hours is [phone]. Option #3 for the Triage Nurse.    After hours call the hospital  at [phone] and ask to speak with the Neurosurgery or ENT Resident on call.              Follow-ups after your visit        Your next 10 appointments already scheduled     Apr 20, 2018 12:40 PM CDT   CT HEAD W/O CONTRAST with UUCT4   [facility] CT ([facility])    [address] [place] [id number] [phone]           Please bring any scans or X-rays taken at other hospitals, if similar tests were done. Also bring a list of your medicines, including vitamins, minerals and over-the-counter drugs. It is safest to leave personal items at home.  Be sure to tell your doctor:   If you have any allergies.   If there s any chance you are pregnant.   If you are breastfeeding.  You do not need to do anything special to prepare for this exam.  Please wear loose clothing, such as a sweat suit or jogging clothes. Avoid snaps, zippers and other metal. We may ask you to undress and put on a hospital gown.            Apr 20, 2018  1:00 PM CDT   (Arrive by 12:45 PM)   MR BRAIN W/O & W CONTRAST with UUMR2   [facility], MRI ([facility])    [address] [place] [id number] [phone]           Take your medicines as usual, unless your doctor tells you not to. Bring a list of your current medicines to your exam (including vitamins, minerals and over-the-counter drugs).  You may or may not receive intravenous (IV) contrast for this exam pending the discretion of the Radiologist.  You do not need to do anything special to prepare.  The MRI machine uses a strong magnet. Please wear clothes without metal (snaps, zippers). A sweatsuit works well, or we may give you a hospital gown.   Please remove any body piercings and hair extensions before you arrive. You will also remove watches, jewelry, hairpins, wallets, dentures, partial dental plates and hearing aids. You may wear contact lenses, and you may be able to wear your rings. We have a safe place to keep your personal items, but it is safer to leave them at home.  **IMPORTANT** THE INSTRUCTIONS BELOW ARE ONLY FOR THOSE PATIENTS WHO HAVE BEEN PRESCRIBED SEDATION OR GENERAL ANESTHESIA DURING THEIR MRI PROCEDURE:  IF YOUR DOCTOR PRESCRIBED ORAL SEDATION (take medicine to help you relax during your exam):   You must get the medicine from your doctor (oral medication) before you arrive. Bring the medicine to the exam. Do not take it at home. You ll be told when to take it upon arriving for your exam.   Arrive one hour early. Bring someone who can take you home after the test. Your medicine will make you sleepy. After the exam, you may not drive, take a bus or take a taxi by yourself.  IF YOUR DOCTOR PRESCRIBED IV SEDATION:   Arrive one hour early. Bring someone who can take you home after the test. Your medicine will make you sleepy. After the exam, you may not drive, take a bus or take a taxi by yourself.   No eating 6 hours before your exam. You may have clear liquids up until 4 hours before your exam. (Clear liquids include water, clear tea, black coffee and fruit juice without pulp.)  IF YOUR DOCTOR PRESCRIBED ANESTHESIA (be asleep for your exam):   Arrive 1 1/2 hours early. Bring someone who can take you home after the test. You may not drive, take a bus or take a taxi by yourself.   No eating 8 hours before your exam. You may have clear liquids up until 4 hours before your exam. (Clear liquids include water, clear tea, black coffee and fruit juice without pulp.)   You will spend four to five hours in the recovery room.  Please call the Imaging Department at your exam site with any questions.            Apr 20, 2018   Procedure with  "Maria Luisa Oshea MD   [facility], [place], Same Day Surgery (--)    [address]  [facility] [place] [id number] [phone]              Future tests that were ordered for you today     Open Future Orders        Priority Expected Expires Ordered    ABO/Rh type and screen Routine 2018    Basic metabolic panel Routine 2018    CBC with platelets Routine 2018    INR Routine 2018    Partial thromboplastin time Routine 2018    UA reflex to Microscopic and Culture Routine 2018            Who to contact     Please call your clinic at [phone] to:    Ask questions about your health    Make or cancel appointments    Discuss your medicines    Learn about your test results    Speak to your doctor            Additional Information About Your Visit        [employer] Information     garbs is an electronic gateway that provides easy, online access to your medical records. With garbs, you can request a clinic appointment, read your test results, renew a prescription or communicate with your care team.     To sign up for garbs visit the website at [email or web address]   You will be asked to enter the access code listed below, as well as some personal information. Please follow the directions to create your username and password.     Your access code is: [email or web address]  Expires: 2018  6:31 AM     Your access code will  in 90 days. If you need help or a new code, please contact your [facility] or call [phone] for assistance.        Care EveryWhere ID     This is your Care EveryWhere ID. This could be used by other organizations to access your [place] medical records  [id number]        Your Vitals Were     Height BMI (Body Mass Index)                1.68 m (5' 6.14\") 28.93 kg/m2           Blood Pressure from Last 3 Encounters: "   04/12/18 120/82   01/12/18 118/73   01/10/18 116/79    Weight from Last 3 Encounters:   04/12/18 81.6 kg (180 lb)   04/12/18 81.6 kg (180 lb)   01/12/18 88.6 kg (195 lb 4.8 oz)              Today, you had the following     No orders found for display       Primary Care Provider Office Phone # Fax #    Clinic [facility] [phone]       [address] [address]        Equal Access to Services     DONELL GARZON : Hadii aad tony hadasho Soomaali, waaxda luqadaha, qaybta kaalmada adeegyada, waxsejal sierra . So [facility] [phone].    ATENCIÓN: Si habla español, tiene a cheek disposición servicios gratuitos de asistencia lingüística. Llame al [phone].    We comply with applicable federal civil rights laws and [place] laws. We do not discriminate on the basis of race, color, national origin, age, disability, sex, sexual orientation, or gender identity.            Thank you!     Thank you for choosing [facility] EAR [facility] TH[facility]AT  for your care. Our goal is always to provide you with excellent care. Hearing back from our patients is one way we can continue to improve our services. Please take a few minutes to complete the written survey that you may receive in the mail after your visit with us. Thank you!             Your Updated Medication List - Protect others around you: Learn how to safely use, store and throw away your medicines at www.disposemymeds.org.          This list is accurate as of 4/12/18  4:54 PM.  Always use your most recent med list.                   Brand Name Dispense Instructions for use Diagnosis    Multi-vitamin Tabs tablet      Take 1 tablet by mouth daily

## 2018-04-12 NOTE — LETTER
4/12/2018       RE: Aurea Prasad  [address] [address]     Dear Colleague,    Thank you for referring your patient, Aurea Prasad, to the [facility] EAR NOSE [facility] TH[facility]AT at [facility]. Please see a copy of my visit note below.    Dear Dr. Oshea:    I had the pleasure of meeting Aurea Prasad in consultation today at the [facility] at your request.       DIAGNOSIS:  Sellar cyst.      HISTORY OF PRESENT ILLNESS:  Ms. Prasad is a 27-year-old female who was recently diagnosed with a sellar cyst.  This was diagnosed after a workup for galactorrhea.  The patient denies any visual symptoms at this point.  No headaches.  She denies any other symptomatology. No previous nasal surgery.       MEDICATIONS:     Current Outpatient Prescriptions   Medication Sig Dispense Refill     multivitamin, therapeutic with minerals (MULTI-VITAMIN) TABS tablet Take 1 tablet by mouth daily         ALLERGIES:  No Known Allergies    HABITS/SOCIAL HISTORY: No smoking, no drinking.    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      PHYSICAL EXAMINATION:    Constitutional: The patient was well-groomed and in no acute distress.    Skin: Warm and pink.    Neurologic: Alert and oriented x3. Cranial nerves  III-XII within normal limits. Voice quality is normal.    Psychiatric: The patient's affect was calm, cooperative and appropriate.    Respiratory: Breathing comfortably without stridor or exertion of accessory muscles.    Eyes: Pupils were equal and reactive. Extraocular movements are intact.    Head: Normocephalic and atraumatic. No lesions or scars.    Ears: External auditory canals and tympanic membranes were clear.    Nose: Sinuses were nontender. Anterior rhinoscopy revealed midline septum and absence of purulence or polyps.    Oral cavity/Oropharynx: Normal tongue, floor of mouth, buccal mucosa and palate. No lesions or masses on inspection or palpation. No abnormal lymph tissue in the oropharynx.    Neck: The parotids are soft without masses. Supple with normal laryngeal and tracheal landmarks.    Lymphatic: There is no palpable lymphadenopathy or other masses in the neck or parotids.       PROCEDURE: Nasal endoscopy: The risks and benefits of the procedure were discussed, and written consent was obtained. A timeout was performed. The patient's nose was sprayed with lidocaine and Afrin, and a 0-degree nasal endoscope was used to gain access into the nasal cavity.  Septum is in the midline.  Inferior and middle turbinate bilaterally are normal.  The nasopharynx is normal.      IMAGING:  The MRI was reviewed that shows a sellar cyst with some compression of the optic chiasm.      ASSESSMENT AND PLAN:  This is a 27-year-old female with a sellar cyst.  The patient was already evaluated by Endocrinology and Neurosurgery.  She was recommended excision of this sellar cyst.  We are going to proceed with surgery.     Thank you very much for the opportunity to participate in the care of your patient.      Mell Mccray M.D.  Otolaryngology- Head & Neck Surgery  [phone]

## 2018-04-12 NOTE — PROGRESS NOTES
Dear Dr. Oshea:    I had the pleasure of meeting Aurea Prasad in consultation today at the [facility] at your request.       DIAGNOSIS:  Sellar cyst.      HISTORY OF PRESENT ILLNESS:  Ms. Prasad is a 27-year-old female who was recently diagnosed with a sellar cyst.  This was diagnosed after a workup for galactorrhea.  The patient denies any visual symptoms at this point.  No headaches.  She denies any other symptomatology. No previous nasal surgery.       MEDICATIONS:     Current Outpatient Prescriptions   Medication Sig Dispense Refill     multivitamin, therapeutic with minerals (MULTI-VITAMIN) TABS tablet Take 1 tablet by mouth daily         ALLERGIES:  No Known Allergies    HABITS/SOCIAL HISTORY: No smoking, no drinking.    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      PHYSICAL EXAMINATION:    Constitutional: The patient was well-groomed and in no acute distress.    Skin: Warm and pink.    Neurologic: Alert and oriented x3. Cranial nerves III-XII within normal limits. Voice quality is normal.    Psychiatric: The patient's affect was calm, cooperative and appropriate.    Respiratory: Breathing comfortably without stridor or exertion of accessory muscles.    Eyes: Pupils were equal and reactive. Extraocular movements are intact.     Head: Normocephalic and atraumatic. No lesions or scars.    Ears: External auditory canals and tympanic membranes were clear.    Nose: Sinuses were nontender. Anterior rhinoscopy revealed midline septum and absence of purulence or polyps.    Oral cavity/Oropharynx: Normal tongue, floor of mouth, buccal mucosa and palate. No lesions or masses on inspection or palpation. No abnormal lymph tissue in the oropharynx.    Neck: The parotids are soft without masses. Supple with normal laryngeal and tracheal landmarks.    Lymphatic: There is no palpable lymphadenopathy or other masses in the neck or parotids.       PROCEDURE: Nasal endoscopy: The risks and benefits of the procedure were discussed, and written consent was obtained. A timeout was performed. The patient's nose was sprayed with lidocaine and Afrin, and a 0-degree nasal endoscope was used to gain access into the nasal cavity.  Septum is in the midline.  Inferior and middle turbinate bilaterally are normal.  The nasopharynx is normal.      IMAGING:  The MRI was reviewed that shows a sellar cyst with some compression of the optic chiasm.      ASSESSMENT AND PLAN:  This is a 27-year-old female with a sellar cyst.  The patient was already evaluated by Endocrinology and Neurosurgery.  She was recommended excision of this sellar cyst.  We are going to proceed with surgery.     Thank you very much for the opportunity to participate in the care of your patient.      Mell Mccray M.D.  Otolaryngology- Head & Neck Surgery  [phone]

## 2018-04-12 NOTE — ANESTHESIA PREPROCEDURE EVALUATION
Anesthesia Evaluation     . Pt has not had prior anesthetic Type: MAC    No history of anesthetic complications          ROS/MED HX    ENT/Pulmonary:  - neg pulmonary ROS    (-) tobacco use and recent URI   Neurologic:     (+)other neuro Cystic sellar mass.    Cardiovascular:  - neg cardiovascular ROS      (-) taking anticoagulants/antiplatelets, MCKEON and orthopnea/PND   METS/Exercise Tolerance:  >4 METS   Hematologic:  - neg hematologic  ROS       Musculoskeletal:  - neg musculoskeletal ROS       GI/Hepatic:  - neg GI/hepatic ROS       Renal/Genitourinary:  - ROS Renal section negative       Endo:     (+) Obesity, Other Endocrine Disorder Bilateral galactorrhea related to hyperprolactinemia .      Psychiatric:  - neg psychiatric ROS       Infectious Disease:  - neg infectious disease ROS       Malignancy:      - no malignancy   Other:    - neg other ROS                 Physical Exam  Normal systems: dental    Airway   Mallampati: I  TM distance: >3 FB  Neck ROM: full    Dental     Cardiovascular   Rhythm and rate: regular and normal  (-) no peripheral edema and no murmur    Pulmonary    breath sounds clear to auscultation    Other findings: 4/12/18: WBC:  10.5; Hgb: 14.1; Hct: 42.3; Plt: 212; INR: 1.08  4/12/18: Na: 138; K: 3.7; Cl: 106; Glu: 83; BUN: 10; Cr: 0.70; Ca: 9.3    12/30/17 MRI Brain:  Impression: T1 and T2 hyperintense sellar mass with suprasellar extension measuring up to 1.8 cm in maximum height with mass effect on the optic chiasm. There is adjacent enhancement on the left which could represent residual pituitary gland versus other etiology. This most likely represents a Rathke's cleft cyst although cannot exclude a cystic craniopharyngioma and short-term follow-up is recommended.             PAC Discussion and Assessment    ASA Classification: 2  Case is suitable for: [place]  Anesthetic techniques and relevant risks discussed: GA  Invasive monitoring and risk discussed: Yes  Types:   Possibility  and Risk of blood transfusion discussed: Yes  NPO instructions given:   Additional anesthetic preparation and risks discussed:   Needs early admission to pre-op area:   Other:     PAC Resident/NP Anesthesia Assessment:  Aurea Prasad is a 27 year old female scheduled to undergo Stealth-Guided Endoscopic Transnasal Resection of Rathke's Cleft Cyst on 4/20/18 with Dr. Oshea.    She has the following specific operative considerations:   1.  Increased risk of postoperative nausea/vomiting with motion sickness: Recommend use of antiemetic agents in the perioperative period.    2.  Labs as ordered by Dr. Oshea.    Revised Cardiac Risk Index: 0.4% risk of major adverse cardiac event.  VTE risk: 0.26%  AVERY risk: Low  PONV risk score= 3.  (If > 2, anti-emetic intervention is recommended.)  Anesthesia considerations: Refer to PAC assessment in the anesthesia records.        Reviewed and Signed by PAC Mid-Level Provider/Resident  Mid-Level Provider/Resident: Shikha Badillo CNP  Date: 4/12/18  Time:     Attending Anesthesiologist Anesthesia Assessment:        Anesthesiologist:   Date:   Time:   Pass/Fail:   Disposition:     PAC Pharmacist Assessment:        Pharmacist:   Date:   Time:      Anesthesia Plan      History & Physical Review  History and physical reviewed and following examination; no interval change.    ASA Status:  2 .    NPO Status:  > 8 hours    Plan for General and ETT with Intravenous and Propofol induction. Maintenance will be Inhalation and Balanced.    PONV prophylaxis:  Ondansetron (or other 5HT-3) and Dexamethasone or Solumedrol  Additional equipment: 2nd IV and Arterial Line      Postoperative Care  Postoperative pain management:  IV analgesics, Oral pain medications and Multi-modal analgesia.      Consents  Anesthetic plan, risks, benefits and alternatives discussed with:  Patient..        To be discussed with staff.   - ASA 2  - GETA with standard ASA monitors, IV induction, balanced anesthetic  -  PIVx2, arterial line   - tylenol and gabapentin preoperatively   - Antibiotics per surgery  - PONV prophylaxis    Audi Napoles DO  [facility]   Department of Anesthesiology  P: [phone]                        .

## 2018-04-12 NOTE — MR AVS SNAPSHOT
After Visit Summary   2018    Aurea Prasad    MRN: [id number]           Patient Information     Date Of Birth          1990        Visit Information        Provider Department      2018 4:30 PM Rn, [facility]        Care Instructions    Preparing for Your Surgery      Name:  Aurea Prasad   MRN:  [id number]   :  1990   Today's Date:  2018     Arriving for surgery:  Surgery date:  2018  Arrival time:  12:40 pm  Please come to:       [facility] [address]  [address] [place]  [address]    -   parking is available in front of the hospital from 5:15 am to 8:00 pm    -  Stop at the Information Desk in the lobby    -   Inform the information person that you are here for surgery. An escort to 3c will be provided. If you would not like an escort, please proceed to 3C on the 3rd floor. [phone]     What can I eat or drink?       -  Please avoid aspirin, ibuprofen, vitamins, and supplements one week before your surgery date.  -  You may have solid food or milk products until 8 hours prior to your surgery or 06:40 am.  -  You may have water, apple juice or 7up/Sprite until 2 hours prior to your surgery or 12:40 pm.    Which medicines can I take?  -  Do NOT take these medications in the morning, the day of surgery:   Does not apply    -  Please take these medications the day of surgery:   Does not apply    How do I prepare myself?  -  Take two showers: one the night before surgery; and one the morning of surgery.         Use Scrubcare or Hibiclens to wash from neck down.  You may use your own shampoo and conditioner. No other hair products.   -  Do NOT use lotion, powder, deodorant, or antiperspirant the day of your surgery.  -  Do NOT wear any makeup, fingernail polish or jewelry.  -  Do not bring your own medications to the hospital, except for inhalers and eye drops.  -  Bring your ID  and insurance card.    Questions or Concerns:  If you have questions or concerns regarding the day of surgery, please call the Preoperative Assessment Center (PAC), Monday-Friday 7AM-7PM:  [phone].  After surgery please call your surgeons office.           AFTER YOUR SURGERY  Breathing exercises   Breathing exercises help you recover faster. Take deep breaths and let the air out slowly. This will:     Help you wake up after surgery.    Help prevent complications like pneumonia.  Preventing complications will help you go home sooner.   We may give you a breathing device (incentive spirometer) to encourage you to breathe deeply.   Nausea and vomiting   You may feel sick to your stomach after surgery; if so, let your nurse know.    Pain control:  After surgery, you may have pain. Our goal is to help you manage your pain. Pain medicine will help you feel comfortable enough to do activities that will help you heal.  These activities may include breathing exercises, walking and physical therapy.   To help your health care team treat your pain we will ask: 1) If you have pain  2) where it is located 3) describe your pain in your words  Methods of pain control include medications given by mouth, vein or by nerve block for some surgeries.  We may give you a pain control pump that will:  1) Deliver the medicine through a tube placed in your vein  2) Control the amount of medicine you receive  3) Allow you to push a button to deliver a dose of pain medicine  Sequential Compression Device (SCD) or Pneumo Boots:  You may need to wear SCD S on your legs or feet. These are wraps connected to a machine that pumps in air and releases it. The repeated pumping helps prevent blood clots from forming.           Follow-ups after your visit        Your next 10 appointments already scheduled     Apr 12, 2018  4:30 PM CDT   (Arrive by 4:15 PM)   PAC RN ASSESSMENT with David Pac Rn   [facility] ([facility]  [facility])    [address] [address]   [phone]            Apr 12, 2018  4:40 PM CDT   (Arrive by 4:25 PM)   New Patient Visit with Mell Mccray MD   [facility] ([facility])    [address] [place] [address]   [phone]            Apr 12, 2018  4:50 PM CDT   (Arrive by 4:35 PM)   PAC Anesthesia Consult with  Pac Anesthesiologist   [facility] ([facility])    [address] [address]   [phone]            Apr 20, 2018 12:40 PM CDT   CT HEAD W/O CONTRAST with UUCT4   [facility], [place], [place] ([facility], [facility])    [address] [place] [id number] [phone]           Please bring any scans or X-rays taken at other hospitals, if similar tests were done. Also bring a list of your medicines, including vitamins, minerals and over-the-counter drugs. It is safest to leave personal items at home.  Be sure to tell your doctor:   If you have any allergies.   If there s any chance you are pregnant.   If you are breastfeeding.  You do not need to do anything special to prepare for this exam.  Please wear loose clothing, such as a sweat suit or jogging clothes. Avoid snaps, zippers and other metal. We may ask you to undress and put on a hospital gown.            Apr 20, 2018  1:00 PM CDT   (Arrive by 12:45 PM)   MR BRAIN W/O & W CONTRAST with UUMR2   [facility], [place], MRI ([facility], [facility])    [address] [facility] [id number] [phone]           Take your medicines as usual, unless your doctor tells you not to. Bring a list of your current medicines to your exam (including vitamins, minerals and over-the-counter drugs).  You may or may not receive intravenous (IV) contrast for  this exam pending the discretion of the Radiologist.  You do not need to do anything special to prepare.  The MRI machine uses a strong magnet. Please wear clothes without metal (snaps, zippers). A sweatsuit works well, or we may give you a hospital gown.  Please remove any body piercings and hair extensions before you arrive. You will also remove watches, jewelry, hairpins, wallets, dentures, partial dental plates and hearing aids. You may wear contact lenses, and you may be able to wear your rings. We have a safe place to keep your personal items, but it is safer to leave them at home.  **IMPORTANT** THE INSTRUCTIONS BELOW ARE ONLY FOR THOSE PATIENTS WHO HAVE BEEN PRESCRIBED SEDATION OR GENERAL ANESTHESIA DURING THEIR MRI PROCEDURE:  IF YOUR DOCTOR PRESCRIBED ORAL SEDATION (take medicine to help you relax during your exam):   You must get the medicine from your doctor (oral medication) before you arrive. Bring the medicine to the exam. Do not take it at home. You ll be told when to take it upon arriving for your exam.   Arrive one hour early. Bring someone who can take you home after the test. Your medicine will make you sleepy. After the exam, you may not drive, take a bus or take a taxi by yourself.  IF YOUR DOCTOR PRESCRIBED IV SEDATION:   Arrive one hour early. Bring someone who can take you home after the test. Your medicine will make you sleepy. After the exam, you may not drive, take a bus or take a taxi by yourself.   No eating 6 hours before your exam. You may have clear liquids up until 4 hours before your exam. (Clear liquids include water, clear tea, black coffee and fruit juice without pulp.)  IF YOUR DOCTOR PRESCRIBED ANESTHESIA (be asleep for your exam):   Arrive 1 1/2 hours early. Bring someone who can take you home after the test. You may not drive, take a bus or take a taxi by yourself.   No eating 8 hours before your exam. You may have clear liquids up until 4 hours before your exam. (Clear  liquids include water, clear tea, black coffee and fruit juice without pulp.)   You will spend four to five hours in the recovery room.  Please call the Imaging Department at your exam site with any questions.            2018   Procedure with Maria Luisa Oshea MD   [facility], [place], Same Day Surgery (--)    [address] [facility] [id number] [phone]              Who to contact     Please call your clinic at [phone] to:    Ask questions about your health    Make or cancel appointments    Discuss your medicines    Learn about your test results    Speak to your doctor            Additional Information About Your Visit        TangoharW4 Information     JoKnot is an electronic gateway that provides easy, online access to your medical records. With Yoozon, you can request a clinic appointment, read your test results, renew a prescription or communicate with your care team.     To sign up for JoKnot visit the website at [email or web address]   You will be asked to enter the access code listed below, as well as some personal information. Please follow the directions to create your username and password.     Your access code is: [email or web address]  Expires: 2018  6:31 AM     Your access code will  in 90 days. If you need help or a new code, please contact your [facility] or call [phone] for assistance.        Care EveryWhere ID     This is your Care EveryWhere ID. This could be used by other organizations to access your [place] medical records  [id number]         Blood Pressure from Last 3 Encounters:   18 120/82   18 118/73   01/10/18 116/79    Weight from Last 3 Encounters:   18 81.6 kg (180 lb)   18 88.6 kg (195 lb 4.8 oz)   01/10/18 89.2 kg (196 lb 9.6 oz)              Today, you had the following     No orders found for display       Primary Care Provider Office Phone # Fax #    Clinic [facility] [phone]        [address] [facility] [address]        Equal Access to Services     DONELL GARZON : Hadii aad ku hadashtynivy Henriquez, washaradda quynh, qajunaid kimblemaya robert, elidia hill. So [facility] [place] [phone].    ATENCIÓN: Si habla español, tiene a cheek disposición servicios gratuitos de asistencia lingüística. Llame al [phone].    We comply with applicable federal civil rights laws and [place] laws. We do not discriminate on the basis of race, color, national origin, age, disability, sex, sexual orientation, or gender identity.            Thank you!     Thank you for choosing [facility] PREOPERATIVE ASSESSMENT [facility]ENTER  for your care. Our goal is always to provide you with excellent care. Hearing back from our patients is one way we can continue to improve our services. Please take a few minutes to complete the written survey that you may receive in the mail after your visit with us. Thank you!             Your Updated Medication List - Protect others around you: Learn how to safely use, store and throw away your medicines at www.disposemymeds.org.          This list is accurate as of 4/12/18  3:36 PM.  Always use your most recent med list.                   Brand Name Dispense Instructions for use Diagnosis    Multi-vitamin Tabs tablet      Take 1 tablet by mouth daily

## 2018-04-13 LAB
BACTERIA SPEC CULT: NORMAL
Lab: NORMAL
SPECIMEN SOURCE: NORMAL

## 2018-04-13 NOTE — H&P
Pre-Operative H & P     Date of Encounter: 4/12/2018  Primary Care Physician:  [facility] Clinic    CC: Rathke's Cleft Cyst.    HPI:  Aurea Prasad is a 27 year old female who presents for pre-operative H & P in preparation for Stealth-Guided Endoscopic Transnasal Resection of Rathke's Cleft Cyst on 4/20/18 with Dr. Oshea at [facility].     The patient was referred to [facility] for further evaluation and management of a 1.8 cm cyst sellar mass with optic nerve compression, as well as galactorrhea with an elevated prolactin level.  In July of 2017, she was having an IUD placed, and mentioned to the midwife that she had been noted a milky discharge from both nipples since early January.  A breast ultrasound was performed and no abnormalities were noted.  Labs identified an elevated prolactin level, so MRI of the brain as ordered, but the patient was lost to follow up.  The MRI was finally completed in December which identified the sellar mass with concern for mass effect on the optic chiasm.    At the 1/10 appointment, she reported irregular menstrual periods, and a mildly low energy level, but a stable weight.  Denied vision changes, excessive sleepiness, or headaches.  Arrangements were made for further laboratory testing.  She was evaluated by Dr. 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  Her hyperprolactinemia is secondary to compression bof the pituitary stalk, and while it could be treated medically, surgical management should be considered.  In addition, it is expected that the cyst will grow, which will eventually compress on the optic apparatus.  She was evaluated by Opthalmology on 2/6, and no concerns were identified.  Arrangements are now being made for the above procedure.  History is obtained from the patient and the medical record.    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         Hx of Blood transfusions/reactions: Denies.    Hx of abnormal bleeding or anti-platelet use: Denies.    Menstrual history: Patient's last menstrual period was 03/12/2018 (approximate).    Steroid use in the last year: Denies.    Personal or FH of difficulty with anesthesia: Denies.    Prior to admission medications  Current Outpatient Prescriptions   Medication Sig Dispense Refill     multivitamin, therapeutic with minerals (MULTI-VITAMIN) TABS tablet Take 1 tablet by mouth daily       Allergies  No Known Allergies    Social History  Social History     Social History     Marital status: Single     Spouse name: N/A     Number of children: N/A     Years of education: N/A     Occupational History     Not on file.     Social History Main Topics     Smoking status: Never Smoker     Smokeless tobacco: Never Used     Alcohol use Yes      Comment: 8 servings a week of beer/wine/cocktail     Drug use: Yes     Special: Cocaine      Comment: Once every other month.      Sexual activity: Yes     Partners: Male     Birth control/ protection: IUD     Other Topics Concern     Not on file     Social History Narrative     Family History  Family History   Problem Relation Age of Onset     Arthritis Mother      No Known Problems Father      No Known Problems Sister      Bipolar Disorder Brother      Breast Cancer Maternal Grandmother      No Known Problems Maternal Grandfather      DIABETES Paternal Grandmother      HEART DISEASE Paternal Grandmother      No Known Problems Brother      Myocardial Infarction Paternal Grandfather      Review of Systems  Functional status: Independent in ADL's.  >4 METS.     The complete review of systems is negative other than noted in the HPI or here.  "  Constitutional: Denies recent changes in weight, sleeping patterns, or fevers/chills.  Eyes: No recent vision changes.  EENT: Denies recent changes in hearing, mouth pain, or difficulty swallowing.  Cardiovascular: Denies chest pain, MCKEON or orthopnea, or palpitations.  Respiratory: Denies shortness of breath or significant cough.    GI: Denies nausea/vomiting or diarrhea/constipation.    : Denies dysuria.    Musculoskeletal: Denies joint pain or swelling.    Skin: Denies rashes or wounds.    Hematologic: Denies easy bruising or bleeding.    Neurologic: Denies migraines, seizures, dizziness, numbness/tingling.  Psychiatric: Denies changes in mood or affect.      /82  Pulse 66  Temp 98  F (36.7  C)  Ht 1.676 m (5' 6\")  Wt 81.6 kg (180 lb)  LMP 03/12/2018 (Approximate)  SpO2 99%  BMI 29.05 kg/m2    180 lbs 0 oz  5' 6\"   Body mass index is 29.05 kg/(m^2).    Physical Exam  Constitutional: Patient awake, seated upright in a chair, in no apparent distress.  Appears stated age.  Eyes: Pupils equal, round and reactive to light.  Extra ocular muscles intact. Sclera clear.  Conjunctiva normal.  HENT: Head normocephalic.  Oral pharynx intact with moist mucous membranes.  Dentition intact.  No thyromegaly appreciated.   Respiratory: Lung sounds clear to auscultation bilaterally.  No rales, rhonchi, or wheezing noted.    Cardiovascular: S1, S2, regular rate and rhythm.  No murmurs, rubs, or gallops noted. Radial and pedal pulses palpable, bilaterally.  No edema noted.   GI: Bowel sounds present.  Abdomen rounded, soft, non-tender to light palpation.  No hepatosplenomegaly or masses palpated.   Genitourinary: Exam deferred.  Lymph/Hematologic: No cervical or supraclavicular lymphadenopathy noted.  No excessive bruising noted.    Skin: Color appropriate for race, warm, dry.  No rashes or wounds at anticipated surgical site.   Musculoskeletal: Full extension of the neck.  No redness, warmth, or swelling of the " joints noted. Gross motor strength is normal.    Neurologic: Alert, oriented to name, place and time. Cranial nerves II-XII are grossly intact. Gait is normal.      Neuropsychiatric: Calm, cooperative. Normal affect.     Labs:  18: WBC:  10.5; Hgb: 14.1; Hct: 42.3; Plt: 212; INR: 1.08  18: Na: 138; K: 3.7; Cl: 106; Glu: 83; BUN: 10; Cr: 0.70; Ca: 9.3      Imagin17 MRI Brain:  Impression: T1 and T2 hyperintense sellar mass with suprasellar extension measuring up to 1.8 cm in maximum height with mass effect on the optic chiasm. There is adjacent enhancement on the left which could represent residual pituitary gland versus other etiology. This most likely represents a Rathke's cleft cyst although cannot exclude a cystic craniopharyngioma and short-term follow-up is recommended.    Lab results were personally reviewed by this provider.        Assessment and Plan  Aurea Prasad is a 27 year old female scheduled to undergo Stealth-Guided Endoscopic Transnasal Resection of Rathke's Cleft Cyst on 18 with Dr. Oshea.    She has the following specific operative considerations:   1.  Increased risk of postoperative nausea/vomiting with motion sickness: Recommend use of antiemetic agents in the perioperative period.    2.  Labs as ordered by Dr. Oshea.    Revised Cardiac Risk Index: 0.4% risk of major adverse cardiac event.  VTE risk: 0.26%  AVERY risk: Low  PONV risk score= 3.  (If > 2, anti-emetic intervention is recommended.)  Anesthesia considerations: Refer to PAC assessment in the anesthesia records.    Patient was discussed with Dr. Landry.    Shikha Badillo NP  Preoperative Assessment Center  [facility]  Phone: [phone]  Fax: [phone]

## 2018-04-18 ENCOUNTER — TELEPHONE (OUTPATIENT)
Dept: NEUROSURGERY | Facility: CLINIC | Age: 28
End: 2018-04-18

## 2018-04-19 ENCOUNTER — TELEPHONE (OUTPATIENT)
Dept: NEUROSURGERY | Facility: CLINIC | Age: 28
End: 2018-04-19

## 2018-04-20 ENCOUNTER — HOSPITAL ENCOUNTER (INPATIENT)
Facility: CLINIC | Age: 28
LOS: 2 days | Discharge: HOME | DRG: 982 | End: 2018-04-22
Attending: NEUROLOGICAL SURGERY | Admitting: NEUROLOGICAL SURGERY
Payer: COMMERCIAL

## 2018-04-20 ENCOUNTER — HOSPITAL ENCOUNTER (OUTPATIENT)
Dept: CT IMAGING | Facility: CLINIC | Age: 28
DRG: 982 | End: 2018-04-20
Attending: NEUROLOGICAL SURGERY | Admitting: NEUROLOGICAL SURGERY
Payer: COMMERCIAL

## 2018-04-20 ENCOUNTER — ANESTHESIA (OUTPATIENT)
Dept: SURGERY | Facility: CLINIC | Age: 28
DRG: 982 | End: 2018-04-20
Payer: COMMERCIAL

## 2018-04-20 DIAGNOSIS — E23.6 RATHKE'S CLEFT CYST (H): ICD-10-CM

## 2018-04-20 DIAGNOSIS — D49.6: Primary | ICD-10-CM

## 2018-04-20 LAB
ABO + RH BLD: NORMAL
ABO + RH BLD: NORMAL
BLD GP AB SCN SERPL QL: NORMAL
BLOOD BANK CMNT PATIENT-IMP: NORMAL
GLUCOSE BLDC GLUCOMTR-MCNC: 95 MG/DL (ref 70–99)
HCG UR QL: NEGATIVE
SPECIMEN EXP DATE BLD: NORMAL

## 2018-04-20 PROCEDURE — 00000155 ZZHCL STATISTIC H-CELL BLOCK W/STAIN: Performed by: NEUROLOGICAL SURGERY

## 2018-04-20 PROCEDURE — 27210995 ZZH RX 272: Performed by: NEUROLOGICAL SURGERY

## 2018-04-20 PROCEDURE — 27210794 ZZH OR GENERAL SUPPLY STERILE: Performed by: NEUROLOGICAL SURGERY

## 2018-04-20 PROCEDURE — 00000102 ZZHCL STATISTIC CYTO WRIGHT STAIN TC: Performed by: NEUROLOGICAL SURGERY

## 2018-04-20 PROCEDURE — 25000125 ZZHC RX 250: Performed by: NEUROLOGICAL SURGERY

## 2018-04-20 PROCEDURE — 70450 CT HEAD/BRAIN W/O DYE: CPT

## 2018-04-20 PROCEDURE — 25000125 ZZHC RX 250: Performed by: NURSE ANESTHETIST, CERTIFIED REGISTERED

## 2018-04-20 PROCEDURE — 88305 TISSUE EXAM BY PATHOLOGIST: CPT | Performed by: NEUROLOGICAL SURGERY

## 2018-04-20 PROCEDURE — 36000076 ZZH SURGERY LEVEL 6 EA 15 ADDTL MIN - UMMC: Performed by: NEUROLOGICAL SURGERY

## 2018-04-20 PROCEDURE — 25000125 ZZHC RX 250: Performed by: ANESTHESIOLOGY

## 2018-04-20 PROCEDURE — 25000132 ZZH RX MED GY IP 250 OP 250 PS 637: Performed by: STUDENT IN AN ORGANIZED HEALTH CARE EDUCATION/TRAINING PROGRAM

## 2018-04-20 PROCEDURE — 81025 URINE PREGNANCY TEST: CPT | Performed by: ANESTHESIOLOGY

## 2018-04-20 PROCEDURE — 25000128 H RX IP 250 OP 636: Performed by: STUDENT IN AN ORGANIZED HEALTH CARE EDUCATION/TRAINING PROGRAM

## 2018-04-20 PROCEDURE — 40000170 ZZH STATISTIC PRE-PROCEDURE ASSESSMENT II: Performed by: NEUROLOGICAL SURGERY

## 2018-04-20 PROCEDURE — 88313 SPECIAL STAINS GROUP 2: CPT | Performed by: NEUROLOGICAL SURGERY

## 2018-04-20 PROCEDURE — 37000008 ZZH ANESTHESIA TECHNICAL FEE, 1ST 30 MIN: Performed by: NEUROLOGICAL SURGERY

## 2018-04-20 PROCEDURE — 25000566 ZZH SEVOFLURANE, EA 15 MIN: Performed by: NEUROLOGICAL SURGERY

## 2018-04-20 PROCEDURE — 25000128 H RX IP 250 OP 636: Performed by: NURSE ANESTHETIST, CERTIFIED REGISTERED

## 2018-04-20 PROCEDURE — 40000014 ZZH STATISTIC ARTERIAL MONITORING DAILY

## 2018-04-20 PROCEDURE — 12000008 ZZH R&B INTERMEDIATE UMMC

## 2018-04-20 PROCEDURE — 00904ZZ DRAINAGE OF BRAIN, PERCUTANEOUS ENDOSCOPIC APPROACH: ICD-10-PCS | Performed by: NEUROLOGICAL SURGERY

## 2018-04-20 PROCEDURE — 36000074 ZZH SURGERY LEVEL 6 1ST 30 MIN - UMMC: Performed by: NEUROLOGICAL SURGERY

## 2018-04-20 PROCEDURE — 40000275 ZZH STATISTIC RCP TIME EA 10 MIN

## 2018-04-20 PROCEDURE — 25000128 H RX IP 250 OP 636: Performed by: RESIDENTIAL TREATMENT FACILITY, PHYSICAL DISABILITIES

## 2018-04-20 PROCEDURE — C1762 CONN TISS, HUMAN(INC FASCIA): HCPCS | Performed by: NEUROLOGICAL SURGERY

## 2018-04-20 PROCEDURE — 88112 CYTOPATH CELL ENHANCE TECH: CPT | Performed by: NEUROLOGICAL SURGERY

## 2018-04-20 PROCEDURE — 00U14KZ SUPPLEMENT CEREBRAL MENINGES WITH NONAUTOLOGOUS TISSUE SUBSTITUTE, PERCUTANEOUS ENDOSCOPIC APPROACH: ICD-10-PCS | Performed by: OTOLARYNGOLOGY

## 2018-04-20 PROCEDURE — 88341 IMHCHEM/IMCYTCHM EA ADD ANTB: CPT | Performed by: NEUROLOGICAL SURGERY

## 2018-04-20 PROCEDURE — 88342 IMHCHEM/IMCYTCHM 1ST ANTB: CPT | Performed by: NEUROLOGICAL SURGERY

## 2018-04-20 PROCEDURE — 25000128 H RX IP 250 OP 636: Performed by: ANESTHESIOLOGY

## 2018-04-20 PROCEDURE — 37000009 ZZH ANESTHESIA TECHNICAL FEE, EACH ADDTL 15 MIN: Performed by: NEUROLOGICAL SURGERY

## 2018-04-20 PROCEDURE — 71000014 ZZH RECOVERY PHASE 1 LEVEL 2 FIRST HR: Performed by: NEUROLOGICAL SURGERY

## 2018-04-20 PROCEDURE — 00000146 ZZHCL STATISTIC GLUCOSE BY METER IP

## 2018-04-20 PROCEDURE — C9399 UNCLASSIFIED DRUGS OR BIOLOG: HCPCS | Performed by: NURSE ANESTHETIST, CERTIFIED REGISTERED

## 2018-04-20 DEVICE — GRAFT DUREPAIR DURA MATRIX 2X2" 62100: Type: IMPLANTABLE DEVICE | Site: BRAIN | Status: FUNCTIONAL

## 2018-04-20 RX ORDER — NALOXONE HYDROCHLORIDE 0.4 MG/ML
.1-.4 INJECTION, SOLUTION INTRAMUSCULAR; INTRAVENOUS; SUBCUTANEOUS
Status: DISCONTINUED | OUTPATIENT
Start: 2018-04-20 | End: 2018-04-22 | Stop reason: HOSPADM

## 2018-04-20 RX ORDER — MAGNESIUM HYDROXIDE 1200 MG/15ML
LIQUID ORAL PRN
Status: DISCONTINUED | OUTPATIENT
Start: 2018-04-20 | End: 2018-04-20 | Stop reason: HOSPADM

## 2018-04-20 RX ORDER — DEXMEDETOMIDINE HYDROCHLORIDE 4 UG/ML
0.2-1.2 INJECTION, SOLUTION INTRAVENOUS CONTINUOUS
Status: DISCONTINUED | OUTPATIENT
Start: 2018-04-20 | End: 2018-04-20

## 2018-04-20 RX ORDER — SODIUM CHLORIDE, SODIUM LACTATE, POTASSIUM CHLORIDE, CALCIUM CHLORIDE 600; 310; 30; 20 MG/100ML; MG/100ML; MG/100ML; MG/100ML
INJECTION, SOLUTION INTRAVENOUS CONTINUOUS
Status: DISCONTINUED | OUTPATIENT
Start: 2018-04-20 | End: 2018-04-20 | Stop reason: HOSPADM

## 2018-04-20 RX ORDER — LABETALOL HYDROCHLORIDE 5 MG/ML
10-40 INJECTION, SOLUTION INTRAVENOUS EVERY 10 MIN PRN
Status: DISCONTINUED | OUTPATIENT
Start: 2018-04-20 | End: 2018-04-22 | Stop reason: HOSPADM

## 2018-04-20 RX ORDER — FENTANYL CITRATE 50 UG/ML
25-50 INJECTION, SOLUTION INTRAMUSCULAR; INTRAVENOUS
Status: DISCONTINUED | OUTPATIENT
Start: 2018-04-20 | End: 2018-04-20 | Stop reason: HOSPADM

## 2018-04-20 RX ORDER — CEFAZOLIN SODIUM 2 G/100ML
2 INJECTION, SOLUTION INTRAVENOUS
Status: COMPLETED | OUTPATIENT
Start: 2018-04-20 | End: 2018-04-20

## 2018-04-20 RX ORDER — MULTIPLE VITAMINS W/ MINERALS TAB 9MG-400MCG
1 TAB ORAL DAILY
Status: DISCONTINUED | OUTPATIENT
Start: 2018-04-21 | End: 2018-04-22 | Stop reason: HOSPADM

## 2018-04-20 RX ORDER — HYDROCORTISONE 20 MG/1
40 TABLET ORAL ONCE
Status: DISCONTINUED | OUTPATIENT
Start: 2018-04-23 | End: 2018-04-22 | Stop reason: HOSPADM

## 2018-04-20 RX ORDER — ONDANSETRON 2 MG/ML
4 INJECTION INTRAMUSCULAR; INTRAVENOUS EVERY 30 MIN PRN
Status: DISCONTINUED | OUTPATIENT
Start: 2018-04-20 | End: 2018-04-20 | Stop reason: HOSPADM

## 2018-04-20 RX ORDER — AMOXICILLIN 250 MG
1 CAPSULE ORAL 2 TIMES DAILY
Status: DISCONTINUED | OUTPATIENT
Start: 2018-04-20 | End: 2018-04-22 | Stop reason: HOSPADM

## 2018-04-20 RX ORDER — FENTANYL CITRATE 50 UG/ML
INJECTION, SOLUTION INTRAMUSCULAR; INTRAVENOUS PRN
Status: DISCONTINUED | OUTPATIENT
Start: 2018-04-20 | End: 2018-04-20

## 2018-04-20 RX ORDER — AMOXICILLIN 250 MG
2 CAPSULE ORAL 2 TIMES DAILY
Status: DISCONTINUED | OUTPATIENT
Start: 2018-04-20 | End: 2018-04-22 | Stop reason: HOSPADM

## 2018-04-20 RX ORDER — ACETAMINOPHEN 325 MG/1
975 TABLET ORAL ONCE
Status: COMPLETED | OUTPATIENT
Start: 2018-04-20 | End: 2018-04-20

## 2018-04-20 RX ORDER — NALOXONE HYDROCHLORIDE 0.4 MG/ML
.1-.4 INJECTION, SOLUTION INTRAMUSCULAR; INTRAVENOUS; SUBCUTANEOUS
Status: ACTIVE | OUTPATIENT
Start: 2018-04-20 | End: 2018-04-21

## 2018-04-20 RX ORDER — SCOLOPAMINE TRANSDERMAL SYSTEM 1 MG/1
1 PATCH, EXTENDED RELEASE TRANSDERMAL
Status: DISCONTINUED | OUTPATIENT
Start: 2018-04-20 | End: 2018-04-22 | Stop reason: HOSPADM

## 2018-04-20 RX ORDER — ONDANSETRON 4 MG/1
4 TABLET, ORALLY DISINTEGRATING ORAL EVERY 30 MIN PRN
Status: DISCONTINUED | OUTPATIENT
Start: 2018-04-20 | End: 2018-04-20 | Stop reason: HOSPADM

## 2018-04-20 RX ORDER — ONDANSETRON 4 MG/1
4-8 TABLET, ORALLY DISINTEGRATING ORAL EVERY 6 HOURS PRN
Status: DISCONTINUED | OUTPATIENT
Start: 2018-04-20 | End: 2018-04-22 | Stop reason: HOSPADM

## 2018-04-20 RX ORDER — PANTOPRAZOLE SODIUM 40 MG/1
40 TABLET, DELAYED RELEASE ORAL
Status: DISCONTINUED | OUTPATIENT
Start: 2018-04-20 | End: 2018-04-22 | Stop reason: HOSPADM

## 2018-04-20 RX ORDER — CEFAZOLIN SODIUM 1 G/3ML
1 INJECTION, POWDER, FOR SOLUTION INTRAMUSCULAR; INTRAVENOUS SEE ADMIN INSTRUCTIONS
Status: DISCONTINUED | OUTPATIENT
Start: 2018-04-20 | End: 2018-04-20 | Stop reason: HOSPADM

## 2018-04-20 RX ORDER — HYDRALAZINE HYDROCHLORIDE 20 MG/ML
10-20 INJECTION INTRAMUSCULAR; INTRAVENOUS EVERY 30 MIN PRN
Status: DISCONTINUED | OUTPATIENT
Start: 2018-04-20 | End: 2018-04-22 | Stop reason: HOSPADM

## 2018-04-20 RX ORDER — HYDROMORPHONE HYDROCHLORIDE 1 MG/ML
.3-.5 INJECTION, SOLUTION INTRAMUSCULAR; INTRAVENOUS; SUBCUTANEOUS EVERY 5 MIN PRN
Status: DISCONTINUED | OUTPATIENT
Start: 2018-04-20 | End: 2018-04-20 | Stop reason: HOSPADM

## 2018-04-20 RX ORDER — PROPOFOL 10 MG/ML
INJECTION, EMULSION INTRAVENOUS CONTINUOUS PRN
Status: DISCONTINUED | OUTPATIENT
Start: 2018-04-20 | End: 2018-04-20

## 2018-04-20 RX ORDER — LIDOCAINE HYDROCHLORIDE 20 MG/ML
INJECTION, SOLUTION INFILTRATION; PERINEURAL PRN
Status: DISCONTINUED | OUTPATIENT
Start: 2018-04-20 | End: 2018-04-20

## 2018-04-20 RX ORDER — ACETAMINOPHEN 325 MG/1
650 TABLET ORAL EVERY 4 HOURS PRN
Status: DISCONTINUED | OUTPATIENT
Start: 2018-04-23 | End: 2018-04-22 | Stop reason: HOSPADM

## 2018-04-20 RX ORDER — PROCHLORPERAZINE MALEATE 10 MG
10 TABLET ORAL EVERY 6 HOURS PRN
Status: DISCONTINUED | OUTPATIENT
Start: 2018-04-20 | End: 2018-04-22 | Stop reason: HOSPADM

## 2018-04-20 RX ORDER — SODIUM CHLORIDE 9 MG/ML
INJECTION, SOLUTION INTRAVENOUS CONTINUOUS
Status: DISCONTINUED | OUTPATIENT
Start: 2018-04-20 | End: 2018-04-21

## 2018-04-20 RX ORDER — HYDROCORTISONE 20 MG/1
20 TABLET ORAL ONCE
Status: DISCONTINUED | OUTPATIENT
Start: 2018-04-23 | End: 2018-04-22 | Stop reason: HOSPADM

## 2018-04-20 RX ORDER — ACETAMINOPHEN 325 MG/1
975 TABLET ORAL EVERY 8 HOURS
Status: DISCONTINUED | OUTPATIENT
Start: 2018-04-20 | End: 2018-04-21

## 2018-04-20 RX ORDER — CEFTRIAXONE 2 G/1
INJECTION, POWDER, FOR SOLUTION INTRAMUSCULAR; INTRAVENOUS PRN
Status: DISCONTINUED | OUTPATIENT
Start: 2018-04-20 | End: 2018-04-20

## 2018-04-20 RX ORDER — LIDOCAINE HYDROCHLORIDE AND EPINEPHRINE 10; 10 MG/ML; UG/ML
INJECTION, SOLUTION INFILTRATION; PERINEURAL PRN
Status: DISCONTINUED | OUTPATIENT
Start: 2018-04-20 | End: 2018-04-20 | Stop reason: HOSPADM

## 2018-04-20 RX ORDER — LIDOCAINE 40 MG/G
CREAM TOPICAL
Status: DISCONTINUED | OUTPATIENT
Start: 2018-04-20 | End: 2018-04-20 | Stop reason: HOSPADM

## 2018-04-20 RX ORDER — ONDANSETRON 2 MG/ML
4-8 INJECTION INTRAMUSCULAR; INTRAVENOUS EVERY 6 HOURS PRN
Status: DISCONTINUED | OUTPATIENT
Start: 2018-04-20 | End: 2018-04-22 | Stop reason: HOSPADM

## 2018-04-20 RX ORDER — HYDROCORTISONE 20 MG/1
20 TABLET ORAL EVERY MORNING
Status: DISCONTINUED | OUTPATIENT
Start: 2018-04-24 | End: 2018-04-22 | Stop reason: HOSPADM

## 2018-04-20 RX ORDER — LIDOCAINE 40 MG/G
CREAM TOPICAL
Status: DISCONTINUED | OUTPATIENT
Start: 2018-04-20 | End: 2018-04-22 | Stop reason: HOSPADM

## 2018-04-20 RX ORDER — GABAPENTIN 300 MG/1
300 CAPSULE ORAL ONCE
Status: COMPLETED | OUTPATIENT
Start: 2018-04-20 | End: 2018-04-20

## 2018-04-20 RX ORDER — HYDROCORTISONE 10 MG/1
10 TABLET ORAL EVERY EVENING
Status: DISCONTINUED | OUTPATIENT
Start: 2018-04-24 | End: 2018-04-22 | Stop reason: HOSPADM

## 2018-04-20 RX ORDER — PROPOFOL 10 MG/ML
INJECTION, EMULSION INTRAVENOUS PRN
Status: DISCONTINUED | OUTPATIENT
Start: 2018-04-20 | End: 2018-04-20

## 2018-04-20 RX ORDER — DEXAMETHASONE SODIUM PHOSPHATE 4 MG/ML
INJECTION, SOLUTION INTRA-ARTICULAR; INTRALESIONAL; INTRAMUSCULAR; INTRAVENOUS; SOFT TISSUE PRN
Status: DISCONTINUED | OUTPATIENT
Start: 2018-04-20 | End: 2018-04-20

## 2018-04-20 RX ORDER — OXYMETAZOLINE HYDROCHLORIDE 0.05 G/100ML
SPRAY NASAL PRN
Status: DISCONTINUED | OUTPATIENT
Start: 2018-04-20 | End: 2018-04-20 | Stop reason: HOSPADM

## 2018-04-20 RX ORDER — ONDANSETRON 2 MG/ML
INJECTION INTRAMUSCULAR; INTRAVENOUS PRN
Status: DISCONTINUED | OUTPATIENT
Start: 2018-04-20 | End: 2018-04-20

## 2018-04-20 RX ADMIN — DEXAMETHASONE SODIUM PHOSPHATE 10 MG: 4 INJECTION, SOLUTION INTRA-ARTICULAR; INTRALESIONAL; INTRAMUSCULAR; INTRAVENOUS; SOFT TISSUE at 16:00

## 2018-04-20 RX ADMIN — ROCURONIUM BROMIDE 50 MG: 10 INJECTION INTRAVENOUS at 15:45

## 2018-04-20 RX ADMIN — SENNOSIDES AND DOCUSATE SODIUM 2 TABLET: 8.6; 5 TABLET ORAL at 22:14

## 2018-04-20 RX ADMIN — ONDANSETRON 4 MG: 2 INJECTION INTRAMUSCULAR; INTRAVENOUS at 22:14

## 2018-04-20 RX ADMIN — HYDROMORPHONE HYDROCHLORIDE 0.2 MG: 1 INJECTION, SOLUTION INTRAMUSCULAR; INTRAVENOUS; SUBCUTANEOUS at 20:21

## 2018-04-20 RX ADMIN — PROPOFOL 50 MG: 10 INJECTION, EMULSION INTRAVENOUS at 16:20

## 2018-04-20 RX ADMIN — CEFTRIAXONE 2 G: 2 INJECTION, POWDER, FOR SOLUTION INTRAMUSCULAR; INTRAVENOUS at 16:30

## 2018-04-20 RX ADMIN — LIDOCAINE HYDROCHLORIDE 70 MG: 20 INJECTION, SOLUTION INFILTRATION; PERINEURAL at 15:09

## 2018-04-20 RX ADMIN — SODIUM CHLORIDE, POTASSIUM CHLORIDE, SODIUM LACTATE AND CALCIUM CHLORIDE: 600; 310; 30; 20 INJECTION, SOLUTION INTRAVENOUS at 16:00

## 2018-04-20 RX ADMIN — SUGAMMADEX 200 MG: 100 INJECTION, SOLUTION INTRAVENOUS at 19:45

## 2018-04-20 RX ADMIN — HYDROMORPHONE HYDROCHLORIDE 0.5 MG: 1 INJECTION, SOLUTION INTRAMUSCULAR; INTRAVENOUS; SUBCUTANEOUS at 20:26

## 2018-04-20 RX ADMIN — ONDANSETRON 4 MG: 2 INJECTION INTRAMUSCULAR; INTRAVENOUS at 16:00

## 2018-04-20 RX ADMIN — SODIUM CHLORIDE, POTASSIUM CHLORIDE, SODIUM LACTATE AND CALCIUM CHLORIDE: 600; 310; 30; 20 INJECTION, SOLUTION INTRAVENOUS at 14:50

## 2018-04-20 RX ADMIN — DEXMEDETOMIDINE HYDROCHLORIDE 0.5 MCG/KG/HR: 100 INJECTION, SOLUTION INTRAVENOUS at 15:15

## 2018-04-20 RX ADMIN — HYDROMORPHONE HYDROCHLORIDE 0.3 MG: 1 INJECTION, SOLUTION INTRAMUSCULAR; INTRAVENOUS; SUBCUTANEOUS at 20:14

## 2018-04-20 RX ADMIN — ROCURONIUM BROMIDE 30 MG: 10 INJECTION INTRAVENOUS at 17:45

## 2018-04-20 RX ADMIN — PROPOFOL 200 MG: 10 INJECTION, EMULSION INTRAVENOUS at 15:09

## 2018-04-20 RX ADMIN — PROPOFOL 15 MCG/KG/MIN: 10 INJECTION, EMULSION INTRAVENOUS at 15:15

## 2018-04-20 RX ADMIN — FENTANYL CITRATE 100 MCG: 50 INJECTION, SOLUTION INTRAMUSCULAR; INTRAVENOUS at 15:30

## 2018-04-20 RX ADMIN — FENTANYL CITRATE 50 MCG: 50 INJECTION, SOLUTION INTRAMUSCULAR; INTRAVENOUS at 16:00

## 2018-04-20 RX ADMIN — CEFAZOLIN SODIUM 2 G: 2 INJECTION, SOLUTION INTRAVENOUS at 16:00

## 2018-04-20 RX ADMIN — ROCURONIUM BROMIDE 20 MG: 10 INJECTION INTRAVENOUS at 18:45

## 2018-04-20 RX ADMIN — SODIUM CHLORIDE: 9 INJECTION, SOLUTION INTRAVENOUS at 21:08

## 2018-04-20 RX ADMIN — ACETAMINOPHEN 975 MG: 325 TABLET, FILM COATED ORAL at 22:23

## 2018-04-20 RX ADMIN — GABAPENTIN 300 MG: 300 CAPSULE ORAL at 12:39

## 2018-04-20 RX ADMIN — SCOPALAMINE 1 PATCH: 1 PATCH, EXTENDED RELEASE TRANSDERMAL at 12:41

## 2018-04-20 RX ADMIN — ROCURONIUM BROMIDE 50 MG: 10 INJECTION INTRAVENOUS at 15:11

## 2018-04-20 RX ADMIN — PROPOFOL 50 MG: 10 INJECTION, EMULSION INTRAVENOUS at 15:30

## 2018-04-20 RX ADMIN — HYDROMORPHONE HYDROCHLORIDE 0.5 MG: 1 INJECTION, SOLUTION INTRAMUSCULAR; INTRAVENOUS; SUBCUTANEOUS at 20:55

## 2018-04-20 RX ADMIN — HYDROCORTISONE SODIUM SUCCINATE 100 MG: 100 INJECTION, POWDER, FOR SOLUTION INTRAMUSCULAR; INTRAVENOUS at 22:15

## 2018-04-20 RX ADMIN — MIDAZOLAM 2 MG: 1 INJECTION INTRAMUSCULAR; INTRAVENOUS at 14:50

## 2018-04-20 RX ADMIN — PANTOPRAZOLE SODIUM 40 MG: 40 TABLET, DELAYED RELEASE ORAL at 22:14

## 2018-04-20 RX ADMIN — ROCURONIUM BROMIDE 20 MG: 10 INJECTION INTRAVENOUS at 16:58

## 2018-04-20 RX ADMIN — ACETAMINOPHEN 975 MG: 325 TABLET, FILM COATED ORAL at 12:39

## 2018-04-20 RX ADMIN — FENTANYL CITRATE 100 MCG: 50 INJECTION, SOLUTION INTRAMUSCULAR; INTRAVENOUS at 15:08

## 2018-04-20 NOTE — IP AVS SNAPSHOT
[address] [address]    Phone:  [phone]                                       After Visit Summary   4/20/2018    Aurea Prasad    MRN: [id number]           After Visit Summary Signature Page     I have received my discharge instructions, and my questions have been answered. I have discussed any challenges I see with this plan with the nurse or doctor.    ..........................................................................................................................................  Patient/Patient Representative Signature      ..........................................................................................................................................  Patient Representative Print Name and Relationship to Patient    ..................................................               ................................................  Date                                            Time    ..........................................................................................................................................  Reviewed by Signature/Title    ...................................................              ..............................................  Date                                                            Time

## 2018-04-20 NOTE — IP AVS SNAPSHOT
MRN:[id number]                      After Visit Summary   4/20/2018    Aurea Prasad    MRN: [id number]           Thank you!     Thank you for choosing [facility] for your care. Our goal is always to provide you with excellent care. Hearing back from our patients is one way we can continue to improve our services. Please take a few minutes to complete the written survey that you may receive in the mail after you visit with us. Thank you!        Patient Information     Date Of Birth          1990        Designated Caregiver       Most Recent Value    Caregiver    Will someone help with your care after discharge? yes    Name of designated caregiver Mckay    Phone number of caregiver See contact info    Caregiver address same as pt      About your hospital stay     You were admitted on:  April 20, 2018 You last received care in the:  [address] [facility]    You were discharged on:  April 22, 2018        Reason for your hospital stay       You underwent surgery to remove a pituitary tumor by  Maria Luisa Oshea MD   - If you have not received the results of the pathology (diagnosis) at the time of discharge, our office will call you with these results as soon as they become available.     - You should follow up with Dr. Pritchett in ENT clinic on 5/3/18.  Sinus precautions: sneeze/cough with mouth open, no straining, no nose blowing, no straws, no bending over forward, no heavy lifting, no incentive spirometer, no positive pressure respiratory treatments  - Good bowel regimen to prevent straining.  - Monitor for clear fluid leak from nose  - Pelletier splints will be removed at follow-up appointment with Dr. Pritchett.    - You should follow up with Dr. Aldana in Endocrinology clinic in 2 weeks for evaluation and management of your hormones. You should call [phone] if you have not heard from the hospital within 7 days of discharge regarding your follow-up appointment.   -continue Hydrocortisone  20 mg AM, 10 mg PM until follow-up with endocrinology  -check Na level on  or 18 to confirm stability (we have placed outpatient order)  -Na check 1 week (order also placed)     - You should follow up with Dr. Oshea  in Neurosurgery clinic in 3 months for follow-up regarding your pituitary resection. You will need an MRI on the day of your follow-up appointment. You should call [phone] if you have not heard from the hospital within 7 days of discharge regarding your follow-up appointment.    - If you are on a steroid medication (hydrocortisone or Cortef) please follow the detailed instructions with the prescription to slowly decrease the amount you are taking. Do not stop taking this medication unless instructed by your physicians.   After discharge, your activity restrictions are:   -We encourage short frequent walks, increasing as tolerated.  - No driving until you are seen in clinic and cleared by your neurosurgeon.   - No strenuous activity.  - No lifting more than 10 pounds until you are seen in clinic (a gallon of milk weighs approximately 8 pounds)  - Do not blow your nose until seen and evaluated in the ENT clinic.   -Nasal precautions: no straining, no nose blowing, sneeze and cough with mouth open   - Avoid straining and/or constipation. Please take the medicines you were prescribed to help prevent constipation.   - You are ok to shower, but do not soak your incisions. Do not submerge your head in water. Pat them dry if they get damp.   - No baths, hot tubs or pools for 4-6 weeks after surgery.       Call if you have any of the followin. Temperature greater than 101.5 F.   2. Any clear discharge from your nose or down the back of your throat  3. Any new weakness, numbness or altered mental status.  4. Increased urinary frequency or increased thirst.  5.  Worsening pain that is not improving with the pain medications you were prescribed.     Call [phone] or after 5:00 pm or on  weekends call [phone] and ask for the  neurosurgery resident on call. Thank You.                  Who to Call     For medical emergencies, please call [phone].  For non-urgent questions about your medical care, please call your primary care provider or clinic, [phone]  For questions related to your surgery, please call your surgery clinic        Attending Provider     Provider Maria Luisa Leonard MD Neurosurgery       Primary Care Provider Office Phone # Fax #    Clinic [facility] [phone]      After Care Instructions     Activity       After discharge, your activity restrictions are:   -We encourage short frequent walks, increasing as tolerated.  - No driving until you are seen in clinic and cleared by your neurosurgeon.   - No strenuous activity.  - No lifting more than 10 pounds until you are seen in clinic (a gallon of milk weighs approximately 8 pounds)  - Do not blow your nose until seen and evaluated in the ENT clinic.   -Nasal precautions: no straining, no nose blowing, sneeze and cough with mouth open   - Avoid straining and/or constipation. Please take the medicines you were prescribed to help prevent constipation.   - You are ok to shower, but do not soak your incisions. Do not submerge your head in water. Pat them dry if they get damp.   - No baths, hot tubs or pools for 4-6 weeks after surgery.            Diet       Follow this diet upon discharge: Orders Placed This Encounter      Combination Diet Regular Diet Adult                  Follow-up Appointments     Adult [facility] Follow-up and recommended labs and tests       Follow-up with Dr. Oshea in 3 months with MRI brain w/wo contrast (pituitary protocol)  Follow-up with Dr. Pritchett on 5/3/18.  Follow-up with endocrinology (Dr. Aldana) in 2 weeks from discharge.    Appointments on [place] and/or [facility] (with [facility] or [facility] provider or service). Call [phone] if you haven't heard regarding these  "appointments within 7 days of discharge.                  Your next 10 appointments already scheduled     May 03, 2018  2:00 PM CDT   (Arrive by 1:45 PM)   NEW TUMOR VISIT with Mell Mccray MD   [facility] ([facility])    [address] [facility] [address] [phone]              Future tests that were ordered for you     XR Chest 2 Views           ABO/Rh Type and Screen           Basic metabolic panel           CBC with platelets differential       Last Lab Result: Hemoglobin (g/dL)       Date                     Value                 02/09/2008               13.5             ----------            INR           Partial thromboplastin time           Routine UA with microscopic           Sodium       Patient to have sodium blood draw on POD 7                  Additional Information     If you use hormonal birth control (such as the pill, patch, ring or implants): You'll need a second form of birth control for 7 days (condoms, a diaphragm or contraceptive foam). While in the hospital, you received a medicine called Bridion. Your normal birth control will not work as well for a week after taking this medicine.          Pending Results     Date and Time Order Name Status Description    4/20/2018 1823 Cytology non gyn In process             Statement of Approval     Ordered          04/22/18 1843  I have reviewed and agree with all the recommendations and orders detailed in this document.  EFFECTIVE NOW     Approved and electronically signed by:  Carolyn Kimball MD             Admission Information     Date & Time Provider Department Dept. Phone    4/20/2018 Maria Luisa Oshea MD [address] [facility] [phone]      Your Vitals Were     Blood Pressure Pulse Temperature Respirations Height Weight    101/59 (BP Location: Right arm) 60 97.1  F (36.2  C) (Oral) 16 1.676 m (5' 6\") 83.8 kg (184 lb 11.2 oz)    Last Period " "Pulse Oximetry BMI (Body Mass Index)             2018 94% 29.81 kg/m2         [employer]lyt Information     [employer]t lets you send messages to your doctor, view your test results, renew your prescriptions, schedule appointments and more. To sign up, go to [email or web address] . Click on \"Log in\" on the left side of the screen, which will take you to the Welcome page. Then click on \"Sign up Now\" on the right side of the page.     You will be asked to enter the access code listed below, as well as some personal information. Please follow the directions to create your username and password.     Your access code is: [email or web address]  Expires: 2018  6:31 AM     Your access code will  in 90 days. If you need help or a new code, please call your [facility] clinic or [phone].        Care EveryWhere ID     This is your Care EveryWhere ID. This could be used by other organizations to access your [facility] medical records  [id number]        Equal Access to Services     [facility] Center[facility]ALID : Hadkel Henriquez, waaxda luirineo, qaybta kaallory robert, elidia sierra . So [facility] [phone].    ATENCIÓN: Si habla español, tiene a cheek disposición servicios gratuitos de asistencia lingüística. Llame al [phone].    We comply with applicable federal civil rights laws and [place] laws. We do not discriminate on the basis of race, color, national origin, age, disability, sex, sexual orientation, or gender identity.               Review of your medicines      START taking        Dose / Directions    * hydrocortisone 20 MG tablet   Commonly known as:  CORTEF        Dose:  40 mg   Start taking on:  2018   Take 2 tablets (40 mg) by mouth once for 1 dose   Quantity:  2 tablet   Refills:  0       * hydrocortisone 20 MG tablet   Commonly known as:  CORTEF   Used for:  Primary intra-cranial tumor (H)        Dose:  20 mg   Start taking on:  2018   Take 1 tablet (20 mg) by mouth once " for 1 dose   Quantity:  1 tablet   Refills:  0       * hydrocortisone 20 MG tablet   Commonly known as:  CORTEF        Dose:  20 mg   Start taking on:  4/24/2018   Take 1 tablet (20 mg) by mouth every morning   Quantity:  50 tablet   Refills:  1       * hydrocortisone 10 MG tablet   Commonly known as:  CORTEF        Dose:  10 mg   Start taking on:  4/24/2018   Take 1 tablet (10 mg) by mouth every evening   Quantity:  50 tablet   Refills:  1       pantoprazole 40 MG EC tablet   Commonly known as:  PROTONIX        Dose:  40 mg   Start taking on:  4/23/2018   Take 1 tablet (40 mg) by mouth every morning (before breakfast)   Quantity:  60 tablet   Refills:  3       polyethylene glycol powder   Commonly known as:  MIRALAX        Dose:  1 capful   Take 17 g (1 capful) by mouth daily   Quantity:  510 g   Refills:  1       sodium chloride 0.65 % nasal spray   Commonly known as:  OCEAN NASAL SPRAY        Dose:  1 spray   Spray 1 spray into both nostrils daily as needed for congestion   Quantity:  2 Bottle   Refills:  3       * Notice:  This list has 4 medication(s) that are the same as other medications prescribed for you. Read the directions carefully, and ask your doctor or other care provider to review them with you.      CONTINUE these medicines which have NOT CHANGED        Dose / Directions    Multi-vitamin Tabs tablet        Dose:  1 tablet   Take 1 tablet by mouth daily   Refills:  0            Where to get your medicines      These medications were sent to [facility] [id number] [place] [address] [facility] & [address] [address]     Phone:  [phone]     hydrocortisone 10 MG tablet    hydrocortisone 20 MG tablet    hydrocortisone 20 MG tablet    hydrocortisone 20 MG tablet    pantoprazole 40 MG EC tablet    polyethylene glycol powder    sodium chloride 0.65 % nasal spray                Protect others around you: Learn how to safely use, store and  throw away your medicines at www.disposemymeds.org.             Medication List: This is a list of all your medications and when to take them. Check marks below indicate your daily home schedule. Keep this list as a reference.      Medications           Morning Afternoon Evening Bedtime As Needed    * hydrocortisone 20 MG tablet   Commonly known as:  CORTEF   Take 2 tablets (40 mg) by mouth once for 1 dose   Start taking on:  4/23/2018                                * hydrocortisone 20 MG tablet   Commonly known as:  CORTEF   Take 1 tablet (20 mg) by mouth once for 1 dose   Start taking on:  4/23/2018                                * hydrocortisone 20 MG tablet   Commonly known as:  CORTEF   Take 1 tablet (20 mg) by mouth every morning   Start taking on:  4/24/2018                                * hydrocortisone 10 MG tablet   Commonly known as:  CORTEF   Take 1 tablet (10 mg) by mouth every evening   Start taking on:  4/24/2018                                Multi-vitamin Tabs tablet   Take 1 tablet by mouth daily   Last time this was given:  1 tablet on 4/22/2018  8:08 AM                                pantoprazole 40 MG EC tablet   Commonly known as:  PROTONIX   Take 1 tablet (40 mg) by mouth every morning (before breakfast)   Start taking on:  4/23/2018   Last time this was given:  40 mg on 4/22/2018  8:07 AM                                polyethylene glycol powder   Commonly known as:  MIRALAX   Take 17 g (1 capful) by mouth daily                                sodium chloride 0.65 % nasal spray   Commonly known as:  OCEAN NASAL SPRAY   Wayne 1 spray into both nostrils daily as needed for congestion                                * Notice:  This list has 4 medication(s) that are the same as other medications prescribed for you. Read the directions carefully, and ask your doctor or other care provider to review them with you.

## 2018-04-21 ENCOUNTER — APPOINTMENT (OUTPATIENT)
Dept: PHYSICAL THERAPY | Facility: CLINIC | Age: 28
DRG: 982 | End: 2018-04-21
Attending: STUDENT IN AN ORGANIZED HEALTH CARE EDUCATION/TRAINING PROGRAM
Payer: COMMERCIAL

## 2018-04-21 LAB
GLUCOSE BLDC GLUCOMTR-MCNC: 134 MG/DL (ref 70–99)
SODIUM SERPL-SCNC: 142 MMOL/L (ref 133–144)
SP GR UR STRIP: 1 (ref 1–1.03)

## 2018-04-21 PROCEDURE — 97116 GAIT TRAINING THERAPY: CPT | Mod: GP

## 2018-04-21 PROCEDURE — 25000132 ZZH RX MED GY IP 250 OP 250 PS 637: Performed by: STUDENT IN AN ORGANIZED HEALTH CARE EDUCATION/TRAINING PROGRAM

## 2018-04-21 PROCEDURE — 81003 URINALYSIS AUTO W/O SCOPE: CPT | Performed by: STUDENT IN AN ORGANIZED HEALTH CARE EDUCATION/TRAINING PROGRAM

## 2018-04-21 PROCEDURE — 36415 COLL VENOUS BLD VENIPUNCTURE: CPT | Performed by: STUDENT IN AN ORGANIZED HEALTH CARE EDUCATION/TRAINING PROGRAM

## 2018-04-21 PROCEDURE — 97161 PT EVAL LOW COMPLEX 20 MIN: CPT | Mod: GP

## 2018-04-21 PROCEDURE — 40000193 ZZH STATISTIC PT WARD VISIT

## 2018-04-21 PROCEDURE — 84295 ASSAY OF SERUM SODIUM: CPT | Performed by: STUDENT IN AN ORGANIZED HEALTH CARE EDUCATION/TRAINING PROGRAM

## 2018-04-21 PROCEDURE — 12000003 ZZH R&B CRITICAL UMMC

## 2018-04-21 PROCEDURE — 97530 THERAPEUTIC ACTIVITIES: CPT | Mod: GP

## 2018-04-21 PROCEDURE — 25000128 H RX IP 250 OP 636: Performed by: STUDENT IN AN ORGANIZED HEALTH CARE EDUCATION/TRAINING PROGRAM

## 2018-04-21 PROCEDURE — 00000146 ZZHCL STATISTIC GLUCOSE BY METER IP

## 2018-04-21 RX ORDER — ACETAMINOPHEN 325 MG/1
975 TABLET ORAL EVERY 6 HOURS PRN
Status: DISCONTINUED | OUTPATIENT
Start: 2018-04-21 | End: 2018-04-21

## 2018-04-21 RX ORDER — ACETAMINOPHEN 325 MG/1
975 TABLET ORAL EVERY 6 HOURS
Status: DISCONTINUED | OUTPATIENT
Start: 2018-04-21 | End: 2018-04-22 | Stop reason: HOSPADM

## 2018-04-21 RX ADMIN — SENNOSIDES AND DOCUSATE SODIUM 2 TABLET: 8.6; 5 TABLET ORAL at 20:03

## 2018-04-21 RX ADMIN — HYDROCORTISONE SODIUM SUCCINATE 100 MG: 100 INJECTION, POWDER, FOR SOLUTION INTRAMUSCULAR; INTRAVENOUS at 14:51

## 2018-04-21 RX ADMIN — HYDROCORTISONE SODIUM SUCCINATE 100 MG: 100 INJECTION, POWDER, FOR SOLUTION INTRAMUSCULAR; INTRAVENOUS at 06:42

## 2018-04-21 RX ADMIN — MULTIPLE VITAMINS W/ MINERALS TAB 1 TABLET: TAB at 08:42

## 2018-04-21 RX ADMIN — ACETAMINOPHEN 975 MG: 325 TABLET, FILM COATED ORAL at 16:43

## 2018-04-21 RX ADMIN — ONDANSETRON 4 MG: 2 INJECTION INTRAMUSCULAR; INTRAVENOUS at 04:06

## 2018-04-21 RX ADMIN — ACETAMINOPHEN 975 MG: 325 TABLET, FILM COATED ORAL at 09:53

## 2018-04-21 RX ADMIN — ACETAMINOPHEN 975 MG: 325 TABLET, FILM COATED ORAL at 21:57

## 2018-04-21 RX ADMIN — PANTOPRAZOLE SODIUM 40 MG: 40 TABLET, DELAYED RELEASE ORAL at 08:41

## 2018-04-21 RX ADMIN — HYDROCORTISONE SODIUM SUCCINATE 50 MG: 100 INJECTION, POWDER, FOR SOLUTION INTRAMUSCULAR; INTRAVENOUS at 21:57

## 2018-04-21 RX ADMIN — SENNOSIDES AND DOCUSATE SODIUM 1 TABLET: 8.6; 5 TABLET ORAL at 08:42

## 2018-04-21 RX ADMIN — ACETAMINOPHEN 975 MG: 325 TABLET, FILM COATED ORAL at 03:23

## 2018-04-21 ASSESSMENT — VISUAL ACUITY
OU: NORMAL ACUITY

## 2018-04-21 NOTE — PLAN OF CARE
Problem: Patient Care Overview  Goal: Plan of Care/Patient Progress Review  POD#0 s/p Stealth Guided Endoscopic Transnasal Resection of Rathke's Cleft Cyst.VSS, headache managed with schedule tylenol 975 mg. Neuro's intact. A/O x4.Photophobic at the moment.Pelletier splint in place.On nasal precaution.Huertas patent with adequate output.Tolerate clear liquid well and ice chips( see flow sheet). C/o nausea,Zofran 4mg IV given with some relief. NS infusing at 75cc/hr.Father at bedside and very supportive. PCD's on. Appropriate with call light.Continue to monitor vital sign and neuro's as ordered.

## 2018-04-21 NOTE — ANESTHESIA PROCEDURE NOTES
Arterial Line Procedure Note  Staff:     Anesthesiologist:  SAIMA GUTIÉRREZ  Location: In OR After Induction  Procedure Start/Stop Times:     patient identified, IV checked, site marked, risks and benefits discussed, informed consent, monitors and equipment checked, pre-op evaluation and at physician/surgeon's request      Correct Patient: Yes      Correct Position: Yes      Correct Site: Yes      Correct Procedure: Yes      Correct Laterality:  Yes    Site Marked:  Yes  Line Placement:     Procedure:  Arterial Line    Insertion Site:  Radial    Insertion laterality:  Left    Skin Prep: Chloraprep      Patient Prep: patient draped, mask, sterile gloves, hat and hand hygiene      Local skin infiltration:  None    Ultrasound Guided?: Yes      A permanent image is entered into patient's chart.      Catheter size:  20 gauge, Quick cath    Dressing:  Tegaderm    Complications:  None obvious    Arterial waveform: Yes      IBP within 10% of NIBP: Yes

## 2018-04-21 NOTE — OP NOTE
Procedure Date: 04/20/2018      PREOPERATIVE DIAGNOSIS:  Cystic sellar mass.      POSTOPERATIVE DIAGNOSIS:  Same.       PROCEDURES PERFORMED:     1.  Endoscopic transnasal approach for resection of cystic mass.   2.  Intraoperative use of frameless stereotactic navigation.      ANESTHESIA:  General.      STAFF SURGEON:  Maria Luisa Oshea MD.      RESIDENT SURGEON:  Jose Juan Mckeon MD.      ANESTHESIA:  General.      ESTIMATED BLOOD LOSS:  50.        URINE OUTPUT:  1200 mL.      INDICATIONS FOR SURGERY:  Ms. Prasad is a 27-year-old lady with galactorrhea for about a year. Her brain MRI shows a cystic lesion in sellar and suprasellar area.  For Ophthalmology evaluation, there was no visual field deficit.  Endocrine workup showed elevated prolactin level which was felt to be secondary to the stalk compression effect.   Based on her young age, the size of the mass, her symptoms, and anticipated growth of the mass, we determined that surgery was indicated. She presents for the above-mentioned procedure.    DESCRIPTION OF PROCEDURE:  Upon intubation and induction of general anesthesia, the patient was placed in a supine position on the operating table.  Wellington head rosales was applied.  Her neck was then extended mildly and all the pressure points carefully padded and well supported. Scalp fiducials were registered and neuronavigation was established with the Stealth system. Please see Dr. Pritchett's operative report regarding details of the endoscopic, transnasal approach to the sella.        Once the floor of the sella was exposed, then we returned. The opening of floor of the sella was initiated with a drill and expanded with Kerrison punches.  Neuronavigation was used to check the limits of the bone removal and the positions of the carotid arteries. Using bayoneted 11 blades, we made 2 incisions to open dura; one is vertical and one is horizontal.  As soon as we made these incisions, thick, tan colored fluid came out  under pressure, which was collected and sent for pathologic study.  Once the cyst was drained out we inspected cavity.  There was a capsule along the dura. We were able to peel off some of the capsule and sent it for pathology study. We dissected as much of the capsule as we could off the dorsum sellae and the pituitary gland.  In superior portion of the right side of the sella, we noticed clear fluid, suspicious for cerebrospinal fluid leakage. No clear deficit was noted on arachnoid membrane. Surgicel was placed along the descended arachnoid. Once we felt that adequate decompression was achieved, bleeding points were secured.  Then the dural opening was then repaired with a dural repair graft placed in onlay fashion with respect to the dura and the inlay with respect to the bone edges.  Please see Dr. Pritchett's operative report regarding details of the closure.        At the end of the operation, the head rosales was removed.  The patient was extubated and taken to PACU in stable condition.  Counts were correct x 2.      Dr. Babb was present for the entire neurosurgical portion and was immediately available for the entire operation.         BRI BABB MD       As dictated by KEYON REDDING MD     ATTESTATION: My signature attests that I was present for the entire neurosurgical portion and immediately available for the entire operation described above.     BRI BABB MD        D: 2018   T: 2018   MT: ALEXANDRA      Name:     SIERRA BOWER   MRN:      [id number]        Account:        [id number]   :      1990           Procedure Date: 2018      Document: [id number]

## 2018-04-21 NOTE — ANESTHESIA CARE TRANSFER NOTE
Patient: Aurea Prasad    Procedure(s):  Stealth Guided Endoscopic Transnasal Resection of Rathke's Cleft Cyst  - Wound Class: I-Clean    Diagnosis: Rathke Cleft cyst   Diagnosis Additional Information: No value filed.    Anesthesia Type:   General, ETT     Note:  Airway :Face Mask  Patient transferred to:PACU  Comments: Pt extubated in the OR without incident or complications. Pt VSS upon arrival to the PACU. Pt has no c/o pain/N/V. Pt care report given to receiving RN.       Vitals: (Last set prior to Anesthesia Care Transfer)    CRNA VITALS  4/20/2018 1927 - 4/20/2018 2001      4/20/2018             Pulse: 69    ART BP: 106/68    ART Mean: 89    Ht Rate: 69    SpO2: 98 %                Electronically Signed By: ALEXSANDRA Gonzalez CRNA  April 20, 2018  8:01 PM

## 2018-04-21 NOTE — PROGRESS NOTES
Otolaryngology Progress Note  April 21, 2018      S: Patient states that she is doing ok this morning. She denies any PND or anterior dripping from the nares. The nasal splint is having some bloody drainage and it has been changed every hour or so. She is drowsy this morning and her significant other is wondering if the frequency of cares can be decreased so she can get some sleep. She denies metallic, salty, funny taste in her mouth/throat.    PHYSICAL EXAM:  Temp: 97  F (36.1  C) Temp src: Oral BP: 130/74 Pulse: 79 Heart Rate: 58 Resp: 14 SpO2: 95 % O2 Device: None (Room air) Oxygen Delivery: 6 LPM  General: laying in bed, no acute distress  HEAD: normocephalic, atraumatic  Face: symmetrical, CN VII intact bilaterally (HB 1)   Nose: mild anterior bloody drainage on the nasal sling as expected for POD1, no evidence of CSF leak  Mouth: moist, no ulcers  Oropharynx: uvula midline, no oropharyngeal erythema, no bloody or clear drainage noted on posterior oropharyngeal wall     ROUTINE IP LABS  No labs ordered at this time    Assessment and Plan  Aurea Prasad is a 27-year-old female who was recently diagnosed with a sellar cyst diagnosed after a workup for galactorrhea now POD 1 s/p transnasal endoscopic approach for resection, healing appropriately.    N: Pain control per neurosurgery primary    HEENT:  - Sinus precautions: sneeze/cough with mouth open, no straining, no nose blowing, no straws, no bending over forward, no heavy lifting, no incentive spirometer, no positive pressure respiratory treatments  - Good bowel regimen to prevent straining. Currently has senokot ordered  - Monitor for CSF leak  - Pelletier splints will be removed in 3 weeks at follow-up appointment. No indication for antibiotics while in place.  - Will Order nasal saline spray on discharge     CV: no acute concerns    Pulm: 95% on RA  - No incentive spirometer per ENT    GI:   - Senokot to prevent straining  - management per neurosurgery  primary    :   - Huertas in place.  - management Per neurosurgery primary    Heme:   - SCDs    ID: [email or web address]  - no antibiotics indicated from ENT    Endo:  - Steroid taper per neurosurgery    - All other cares per primary team.  Please feel free to contact ENT on-call with questions or concerns.    Rajesh Morales MD  PGY-1  Pager: [phone]  Otolaryngology-Head & Neck Surgery  Please page ENT with questions by dialing [phone] and entering job code [phone] when prompted.

## 2018-04-21 NOTE — PLAN OF CARE
Problem: Patient Care Overview  Goal: Plan of Care/Patient Progress Review  Discharge Planner PT   Patient plan for discharge: home   Current status: PT eval completed, tx indicated. Pt educated on crani/sinus precautions and handout provided. Performed bed mobility and supine>sit with HOB slightly elevated - mod indep. Able to kate bottoms in supine - mod indep. She completed STSs - mod indep. Pt ambulated 500'+ without AD, supervised for lines and tubes. Pt completed 4x4 steps with L ascending handrail - mod indep.   Barriers to return to prior living situation: medical needs   Recommendations for discharge: home with support of brother/family   Rationale for recommendations: Pt would benefit from cont IP PT for maintenance and safe progression of activity tolerance and strengthening.        Entered by: Tresa Curiel 04/21/2018 10:31 AM

## 2018-04-21 NOTE — PROGRESS NOTES
Neurosurgery Progress Note     Name: Aurea Prasad  Age/Sex: 27F  Rm: [id number]  MRN:  [id number]  Staff: Dayo  Date of Admission: 4/20/2018  Primary Service: Neurosurgery  Consulting Services: ENT    HPI: Ms. Prasad is a 27-year-old right-handed woman who presented with about 1 year of galactorrhea bilaterally.  An elevated prolactin level was found which led to an MRI.  This MRI was completed recently and showed a cystic mass with mild mass effect on the optic apparatus.  Her galactorrhea is bilateral and must be expressed.  She had a 5-month interval with no period but then it resumed this month.  She denied any headaches or visual changes.  Her other symptom is fatigue.     Procedures:   4/20: Stealth Guided Endoscopic Transnasal Resection of Rathke's Cleft Cyst     Daily events:   4/20: OR as Above; Transferred to Unit 6A Post-Operatively     Examination: intact    Assessment/Plan of care: 27-year-old right-handed female POD #1 s/p transnasal resection of rathke s cleft cyst. Neurologically intact.    NEURO:   - Post-Operative Pain Control  - Neuro Examination Q 4 HR  - Cortef taper    CV:  - Goal SBP < 140 mmHg    PULM:   - Incentive Spirometry Q 1 HR     GI/FEN:  - Advance Diet As Tolerated  - GI Prophylaxis Not Indicated  - Electrolyte Replacement Protocol  - Bowel Regimen w/ Senna and Miralax    RENAL/:   - D/C Huertas POD #1  - Continue 0.9 NaCl 100 mL/hour    HEME/ID:  - Plts > 100k, hgb > 8, INR < 1.5   - DVT Prophylaxis: SCDs to BLE    ENDO:   - No Issues    DISPO:  Anticipate D/C Home    Barriers: evaluation by therapies, ambulating, BM/flatus, voiding without a Huertas, tolerating PO diet  Activity: Up in Chair TID; Ambulate w/ Assist  PT: Evaluation Pending  OT: Evaluation Pending    Kari Hernandez MD  Neurosurgery PGY3

## 2018-04-21 NOTE — BRIEF OP NOTE
[facility], [place]    Brief Operative Note    Pre-operative diagnosis: Rathke Cleft cyst   Post-operative diagnosis Rathke Cleft cyst   Procedure: Procedure(s):  Stealth Guided Endoscopic Transnasal Resection of Rathke's Cleft Cyst  - Wound Class: I-Clean  Surgeon: Surgeon(s) and Role:     * Maria Luisa Oshea MD - Primary     * Mell Mccray MD - Assisting     * Eri Mitchell MD - Resident - Assisting     * Jose Juan Mckeon MD - Resident - Assisting     * Yanet Steiner MD - Resident - Assisting  Anesthesia: General   Estimated blood loss: 50 ml  Urine output: 1200 ml  Drains: None  Specimens:   ID Type Source Tests Collected by Time Destination   A : Sella Cyst Fluid Fluid Brain CYTOLOGY NON GYN Maria Luisa Oshea MD 4/20/2018  6:21 PM    B : Sellar Cyst Tissue Brain SURGICAL PATHOLOGY EXAM Maria Luisa Oshea MD 4/20/2018  6:33 PM      Findings:   Cystic lesion s/p drained; small CSF leak repaired with surgicel, durepair, nasoseptal flap, surgicel, duraseal, and gelfoam  Complications: None.    ENT plan:    - Sinus precautions: sneeze/cough with mouth open, no straining, no nose blowing, no straws, no bending over forward, no heavy lifting, no incentive spirometer, no positive pressure respiratory treatments  - Good bowel regimen prevent straining  - Monitor for CSF leak  - Pelletier splints will be removed in 3 weeks at follow-up appointment. No indication for antibiotics while in place.  - Order nasal saline spray on discharge   - All other cares per NSGY.  Please feel free to contact ENT on-call with questions or concerns.

## 2018-04-21 NOTE — PROGRESS NOTES
04/21/18 1000   Quick Adds   Type of Visit Initial PT Evaluation  (Tresa Curiel, PT, DP T)       Present no   Language english    Living Environment   Lives With other relative(s) (specify)  (brother and sister in law and their 2 children (8 and 10 yo))   Living Arrangements house  (2 story home )   Home Accessibility tub/shower is not walk in;stairs within home;stairs (1 railing present)   Number of Stairs to Enter Home 0  (ramp entry )   Number of Stairs Within Home 12  (L ascending handrail )   Stair Railings at Home inside, present on left side   Transportation Available car   Living Environment Comment Pt lives with brother and ASHWIN and their 2 children in 2 story home. Bedroom and bathroom are on second floor. Claw foot tub.    Self-Care   Dominant Hand right   Usual Activity Tolerance excellent   Current Activity Tolerance moderate   Regular Exercise yes   Activity/Exercise Type other (see comments);team sports  (volleyball )   Exercise Amount/Frequency 3-5 times/wk   Equipment Currently Used at Home none   Activity/Exercise/Self-Care Comment Pt reports indep wtih ADLs PTA . 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   Functional Level Prior   Ambulation 0-->independent   Transferring 0-->independent   Toileting 0-->independent   Bathing 0-->independent   Dressing 0-->independent   Eating 0-->independent   Communication 0-->understands/communicates without difficulty   Swallowing 0-->swallows foods/liquids without difficulty   Cognition 0 - no cognition issues reported   Fall history within last six months no   Which of the above functional risks had a recent onset or change? ambulation   Prior Functional Level Comment Pt indep with all mobility PTA . 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    General Information   Onset of Illness/Injury or Date of Surgery - Date 04/20/18   Referring Physician Carolyn Kimball MD   Patient/Family Goals Statement return to OF    Pertinent History  of Current Problem (include personal factors and/or comorbidities that impact the POC) 27-year-old right-handed woman who presented with about 1 year of galactorrhea bilaterally.  An elevated prolactin level was found which led to an MRI.  This MRI was completed recently and showed a cystic mass with mild mass effect on the optic apparatus.  Her galactorrhea is bilateral and must be expressed.  She had a 5-month interval with no period but then it resumed this month.  She denied any headaches or visual changes.  Her other symptom is fatigue.    Precautions/Limitations other (see comments)  (crani/sinus precautions )   Weight-Bearing Status - LUE full weight-bearing   Weight-Bearing Status - RUE full weight-bearing   Weight-Bearing Status - LLE full weight-bearing   Weight-Bearing Status - RLE full weight-bearing   General Info Comments activity: up with A    Cognitive Status Examination   Orientation orientation to person, place and time   Level of Consciousness alert   Follows Commands and Answers Questions 100% of the time   Personal Safety and Judgment intact   Memory intact   Pain Assessment   Patient Currently in Pain No   Integumentary/Edema   Integumentary/Edema no deficits were identifed   Posture    Posture Not impaired   Range of Motion (ROM)   ROM Comment WFL throughout    Strength   Strength Comments grossly 5/5 UE and LE    Bed Mobility   Bed Mobility Comments bed mobility, rolling L, via log roll. Supine>sit with HOB slightly elevated and minor use of UEs for support/control    Transfer Skills   Transfer Comments Performed STS with minor use of UE for support/control    Gait   Gait Comments Ambulated with slightly dec gait speed and some staggering though no overt LOB, attributed to first time OOB since surgery   Balance   Balance Comments not formally assessed, no overt LOB static or dynamic    Sensory Examination   Sensory Perception no deficits were identified   Coordination   Coordination no  "deficits were identified   Muscle Tone   Muscle Tone no deficits were identified   General Therapy Interventions   Planned Therapy Interventions home program guidelines;progressive activity/exercise   Clinical Impression   Criteria for Skilled Therapeutic Intervention yes, treatment indicated   PT Diagnosis dec functional mobliity   Influenced by the following impairments post op precautions, pain, fatigue    Functional limitations due to impairments endurance    Clinical Presentation Stable/Uncomplicated   Clinical Presentation Rationale PMH, clinical judgement, current level of mobility    Clinical Decision Making (Complexity) Low complexity   Therapy Frequency` 3 times/week   Predicted Duration of Therapy Intervention (days/wks) 4/23/18   Anticipated Equipment Needs at Discharge (none)   Anticipated Discharge Disposition Home   Risk & Benefits of therapy have been explained Yes   Patient, Family & other staff in agreement with plan of care Yes   Clinical Impression Comments PT eval completed, tx indicated    [facility] TM \"6 Clicks\"   2016, Trustees of [facility], under license to [employer].  All rights reserved.   6 Clicks Short Forms Basic Mobility Inpatient Short Form   [facility] AM-PAC  \"6 Clicks\" V.2 Basic Mobility Inpatient Short Form   1. Turning from your back to your side while in a flat bed without using bedrails? 4 - None   2. Moving from lying on your back to sitting on the side of a flat bed without using bedrails? 4 - None   3. Moving to and from a bed to a chair (including a wheelchair)? 4 - None   4. Standing up from a chair using your arms (e.g., wheelchair, or bedside chair)? 4 - None   5. To walk in hospital room? 4 - None   6. Climbing 3-5 steps with a railing? 4 - None   Basic Mobility Raw Score (Score out of 24.Lower scores equate to lower levels of function) 24   Total Evaluation Time   Total Evaluation Time (Minutes) 6     "

## 2018-04-21 NOTE — ANESTHESIA POSTPROCEDURE EVALUATION
Patient: Aurea Prasad    Procedure(s):  Stealth Guided Endoscopic Transnasal Resection of Rathke's Cleft Cyst  - Wound Class: I-Clean    Diagnosis:Rathke Cleft cyst   Diagnosis Additional Information: No value filed.    Anesthesia Type:  General, ETT    Note:  Anesthesia Post Evaluation    Patient location during evaluation: PACU  Patient participation: Able to fully participate in evaluation  Level of consciousness: awake and alert  Pain management: adequate  Airway patency: patent  Cardiovascular status: hemodynamically stable  Respiratory status: acceptable  Hydration status: acceptable  PONV: none             Last vitals:  Vitals:    04/20/18 2015 04/20/18 2030 04/20/18 2045   BP: 105/70 102/68 105/65   Pulse:      Resp: 12 14    Temp:      SpO2: 95% 95% 94%         Electronically Signed By: Juana Fleming MD  April 20, 2018  8:58 PM

## 2018-04-21 NOTE — PLAN OF CARE
Problem: Patient Care Overview  Goal: Plan of Care/Patient Progress Review  POD 0 going on 1 for transnasal endoscopic cyst resection. Neuros intact. VSS on RA. Pelletier splint and nasal sling intact, patient able to change gauze. Small amount of sanguineous discharge from both nare. Strict I&O. Family/patient to inform how much PO liquid consumed. Sinus precautions maintained. Huertas in place with adequate output. Tolerating clear liquids. PIV 75ml/hr NS. Has not been OOB. Headache managed with tylenol, and nausea managed with Zofran. Denies salty/metalic taste. Continue to monitor.

## 2018-04-22 VITALS
DIASTOLIC BLOOD PRESSURE: 59 MMHG | OXYGEN SATURATION: 94 % | BODY MASS INDEX: 29.68 KG/M2 | HEIGHT: 66 IN | HEART RATE: 60 BPM | SYSTOLIC BLOOD PRESSURE: 101 MMHG | WEIGHT: 184.7 LBS | TEMPERATURE: 97.1 F | RESPIRATION RATE: 16 BRPM

## 2018-04-22 DIAGNOSIS — E23.2 DIABETES INSIPIDUS (H): Primary | ICD-10-CM

## 2018-04-22 LAB
GLUCOSE BLDC GLUCOMTR-MCNC: 116 MG/DL (ref 70–99)
SODIUM SERPL-SCNC: 142 MMOL/L (ref 133–144)
SODIUM SERPL-SCNC: 143 MMOL/L (ref 133–144)
SODIUM SERPL-SCNC: 144 MMOL/L (ref 133–144)

## 2018-04-22 PROCEDURE — 25000132 ZZH RX MED GY IP 250 OP 250 PS 637: Performed by: STUDENT IN AN ORGANIZED HEALTH CARE EDUCATION/TRAINING PROGRAM

## 2018-04-22 PROCEDURE — 25000128 H RX IP 250 OP 636: Performed by: STUDENT IN AN ORGANIZED HEALTH CARE EDUCATION/TRAINING PROGRAM

## 2018-04-22 PROCEDURE — 36415 COLL VENOUS BLD VENIPUNCTURE: CPT | Performed by: STUDENT IN AN ORGANIZED HEALTH CARE EDUCATION/TRAINING PROGRAM

## 2018-04-22 PROCEDURE — 00000146 ZZHCL STATISTIC GLUCOSE BY METER IP

## 2018-04-22 PROCEDURE — 84295 ASSAY OF SERUM SODIUM: CPT | Performed by: STUDENT IN AN ORGANIZED HEALTH CARE EDUCATION/TRAINING PROGRAM

## 2018-04-22 RX ORDER — HYDROCORTISONE 20 MG/1
40 TABLET ORAL ONCE
Qty: 2 TABLET | Refills: 0 | Status: SHIPPED | OUTPATIENT
Start: 2018-04-23 | End: 2018-04-23

## 2018-04-22 RX ORDER — HYDROCORTISONE 10 MG/1
10 TABLET ORAL EVERY EVENING
Qty: 50 TABLET | Refills: 1 | Status: SHIPPED | OUTPATIENT
Start: 2018-04-24 | End: 2018-07-17

## 2018-04-22 RX ORDER — HYDROCORTISONE 20 MG/1
20 TABLET ORAL EVERY MORNING
Qty: 50 TABLET | Refills: 1 | Status: SHIPPED | OUTPATIENT
Start: 2018-04-24 | End: 2018-07-17

## 2018-04-22 RX ORDER — MAGNESIUM CARB/ALUMINUM HYDROX 105-160MG
296 TABLET,CHEWABLE ORAL ONCE
Status: COMPLETED | OUTPATIENT
Start: 2018-04-22 | End: 2018-04-22

## 2018-04-22 RX ORDER — ECHINACEA PURPUREA EXTRACT 125 MG
1 TABLET ORAL DAILY PRN
Qty: 2 BOTTLE | Refills: 3 | Status: SHIPPED | OUTPATIENT
Start: 2018-04-22 | End: 2018-07-17

## 2018-04-22 RX ORDER — HYDROCORTISONE 20 MG/1
20 TABLET ORAL ONCE
Qty: 1 TABLET | Refills: 0 | Status: SHIPPED | OUTPATIENT
Start: 2018-04-23 | End: 2018-04-23

## 2018-04-22 RX ORDER — POLYETHYLENE GLYCOL 3350 17 G/17G
1 POWDER, FOR SOLUTION ORAL DAILY
Qty: 510 G | Refills: 1 | Status: SHIPPED | OUTPATIENT
Start: 2018-04-22 | End: 2018-07-17

## 2018-04-22 RX ORDER — PANTOPRAZOLE SODIUM 40 MG/1
40 TABLET, DELAYED RELEASE ORAL
Qty: 60 TABLET | Refills: 3 | Status: SHIPPED | OUTPATIENT
Start: 2018-04-23 | End: 2018-07-17

## 2018-04-22 RX ADMIN — ONDANSETRON 4 MG: 2 INJECTION INTRAMUSCULAR; INTRAVENOUS at 14:52

## 2018-04-22 RX ADMIN — ACETAMINOPHEN 975 MG: 325 TABLET, FILM COATED ORAL at 03:39

## 2018-04-22 RX ADMIN — PANTOPRAZOLE SODIUM 40 MG: 40 TABLET, DELAYED RELEASE ORAL at 08:07

## 2018-04-22 RX ADMIN — ACETAMINOPHEN 975 MG: 325 TABLET, FILM COATED ORAL at 14:50

## 2018-04-22 RX ADMIN — HYDROCORTISONE SODIUM SUCCINATE 50 MG: 100 INJECTION, POWDER, FOR SOLUTION INTRAMUSCULAR; INTRAVENOUS at 06:04

## 2018-04-22 RX ADMIN — SENNOSIDES AND DOCUSATE SODIUM 2 TABLET: 8.6; 5 TABLET ORAL at 08:09

## 2018-04-22 RX ADMIN — MULTIPLE VITAMINS W/ MINERALS TAB 1 TABLET: TAB at 08:08

## 2018-04-22 RX ADMIN — MAGESIUM CITRATE 296 ML: 1.75 LIQUID ORAL at 08:08

## 2018-04-22 RX ADMIN — HYDROCORTISONE SODIUM SUCCINATE 50 MG: 100 INJECTION, POWDER, FOR SOLUTION INTRAMUSCULAR; INTRAVENOUS at 14:51

## 2018-04-22 ASSESSMENT — VISUAL ACUITY
OU: NORMAL ACUITY

## 2018-04-22 NOTE — PLAN OF CARE
Problem: Patient Care Overview  Goal: Plan of Care/Patient Progress Review  POD#1 s/p Stealth Guided Endoscopic Transnasal Resection of Rathke's Cleft Cyst.VSS, headache managed with schedule tylenol 975 mg. Neuro's intact. A/O x4.Pelletier splint in place.On sinus precaution.Small bloody drainage from nares. Denies having metallic taste.Huertas d/c'd this evening.Tolerate regular well. Strict I&O( see flow sheet).Specific gravity level 1.002 and Na 142, MD aware.Denies nausea this shift.PIV SL.Up independently,ambulated multiple times this shift.Family at bedside and very supportive. PCD's on. Appropriate with call light.Continue to monitor vital sign and neuro's as ordered.

## 2018-04-22 NOTE — PLAN OF CARE
Problem: Patient Care Overview  Goal: Plan of Care/Patient Progress Review  Outcome: Improving  Pt is POD# 2 s/p transnasal endoscopic approach for resection of cyst. A&Ox4. AVSS. Neuros intact. Denied metallic, salty or vinnie taste in moutht. Dabbing on tissue small amt of sanguinous drainage. Nasal precautions maintained. Denies pain. Tolerating regular diet with a good appetite. PIV SL. Strict I&O. Last sodium was 144. Voiding spontaneously. No bm yet. Magnesium citrate and senna given this a.m. Up and ambulating hallways independently. Likely d/c this evening or tomorrow morning.

## 2018-04-22 NOTE — PROGRESS NOTES
Otolaryngology Progress Note  April 22, 2018      S: No acute events overnight. Pain is controlled. Denies any vision changes. Anterior nasal drainage has decreased, still with small amount of sanguinous drainage. Appropriate urine output. Huertas removed. Tolerating PO. Denies salty or metallic taste and no fluid dripping down the back or front of nose. No BM yet.     PHYSICAL EXAM:  Temp: 96.6  F (35.9  C) Temp src: Axillary BP: 117/74 Pulse: 60 Heart Rate: 77 Resp: 14 SpO2: 97 % O2 Device: None (Room air)    General: laying in bed, no acute distress  HEAD: normocephalic, atraumatic  Face: symmetrical, CN VII intact bilaterally (HB 1)   Nose: mild anterior bloody drainage on the nasal sling, no evidence of CSF leak  Mouth: moist, no ulcers  Oropharynx: uvula midline, no oropharyngeal erythema, no bloody or clear drainage noted on posterior oropharyngeal wall     ROUTINE IP LABS  Labs were reviewed.     Assessment and Plan  Aurea Prasad is a 27-year-old female who was recently diagnosed with a sellar cyst diagnosed after a workup for galactorrhea now POD 2 s/p transnasal endoscopic approach for resection, healing appropriately.    N: Pain control per neurosurgery primary    HEENT:  - Sinus precautions: sneeze/cough with mouth open, no straining, no nose blowing, no straws, no bending over forward, no heavy lifting, no incentive spirometer, no positive pressure respiratory treatments  - Good bowel regimen to prevent straining. Currently has senokot ordered  - Monitor for CSF leak  - Pelletier splints will be removed in 3 weeks at follow-up appointment. No indication for antibiotics while in place.  - Will Order nasal saline spray on discharge     CV: no acute concerns    Pulm: 95% on RA  - No incentive spirometer per ENT    GI:   - Senokot to prevent straining. Would recommend increasing bowel regimen today as she has not had BM.   - management per neurosurgery primary    :   - Huertas removed, voiding spontaneously.   -  management per neurosurgery primary    Heme:   - SCDs    ID: afebrile  - no antibiotics indicated from ENT    Endo:  - Steroid taper per neurosurgery    Dispo: Okay to DC from ENT standpoint. Final dispo is up to primary team.     Patient and plan discussed with staff surgeon, Dr. Yarely Durham MD PGY-2  Otolaryngology-Head & Neck Surgery  Please contact ENT by dialing [phone] and entering job code [phone] when prompted.

## 2018-04-22 NOTE — PROGRESS NOTES
Name: Aurea Prasad  Age/Sex: 27F  Rm: 6213-01  MRN:  [id number]  Staff: Dayo  Date of Admission: 4/20/2018  Primary Service: Neurosurgery  Consulting Services: ENT    HPI: Ms. Prasad is a 27-year-old right-handed woman who presented with about 1 year of galactorrhea bilaterally.  An elevated prolactin level was found which led to an MRI.  This MRI was completed recently and showed a cystic mass with mild mass effect on the optic apparatus.  Her galactorrhea is bilateral and must be expressed.  She had a 5-month interval with no period but then it resumed this month.  She denied any headaches or visual changes.  Her other symptom is fatigue.     Procedures:   4/20: Stealth Guided Endoscopic Transnasal Resection of Rathke's Cleft Cyst     Daily events:   4/20: OR as Above; Transferred to Unit 6A Post-Operatively     Examination: intact    Assessment/Plan of care: 27-year-old right-handed female POD #2 s/p transnasal resection of rathke s cleft cyst. Neurologically intact.    NEURO:   - Post-Operative Pain Control  - Neuro Examination Q 4 HR  - Cortef taper    CV:  - Goal SBP < 140 mmHg    PULM:   - Incentive Spirometry Q 1 HR     GI/FEN:  - Advance Diet As Tolerated  - GI Prophylaxis Not Indicated  - Electrolyte Replacement Protocol  - Bowel Regimen w/ Senna and Miralax    RENAL/:   - Voiding spontaneously   - no mIVF    HEME/ID:  - Plts > 100k, hgb > 8, INR < 1.5   - DVT Prophylaxis: SCDs to BLE    ENDO:   - No Issues    DISPO: Anticipate D/C Home 4/24  Barriers: evaluation by therapies, high UOP controlled  Activity: Up in Chair TID; Ambulate w/ Assist  PT: HWA  OT: Evaluation Pending      Contact the neurosurgery resident on call with questions.    Dial * * [phone]: Enter job code [phone] when prompted.    Pablo Cordero MD  Neurosurgery PGY2

## 2018-04-22 NOTE — PROGRESS NOTES
SPIRITUAL HEALTH SERVICES   [facility] ([place]) 6A   ON-CALL VISIT   REFERRAL SOURCE: request for communion prior to anticipated discharge within a day   Aurea was joined by a male visitor.  In response to her interest in receiving communion, reviewed the schedule of eucharistic ministers.  When asked about her home parish, she indicated that she was a member of [facility] in [place] and that she was sure Father Monty would be willing to visit and offer communion.   PLAN: Per her request, called and left a message for Father Monty extending her request.  Will notify the unit  of our visit.     LINDSAY Gamble.  Staff   Pager [phone]

## 2018-04-22 NOTE — DISCHARGE SUMMARY
[facility] Discharge Summary and Instructions    Aurea Prasad MRN# [id number]   Age: 27 year old YOB: 1990     Date of Admission:  4/20/2018  Date of Discharge::  4/22/2018  7:13 PM  Admitting Physician:  Maria Luisa Oshea MD  Discharge Physician:  Maria Luisa Oshea MD          Admission Diagnoses:   Rathke's cleft cyst (H) [E23.6]          Discharge Diagnosis:   Rathke's cleft cyst (H) [E23.6]          Procedures:   4/20: Stealth Guided Endoscopic Transnasal Resection of Rathke's Cleft Cyst            Brief History of Illness:   Ms. Prasad is a 27-year-old right-handed woman who presented with about 1 year of galactorrhea bilaterally.  An elevated prolactin level was found which led to an MRI.  This MRI was completed recently and showed a cystic mass with mild mass effect on the optic apparatus.  Her galactorrhea is bilateral and must be expressed.  She had a 5-month interval with no period but then it resumed this month.  She denied any headaches or visual changes.  Her other symptom is fatigue.            Hospital Course:   Patient underwent above-mentioned procedure on 4/20/18. The operation was uncomplicated and he was admitted to the floor for routine post-operative cares. On post operative day 1, she was ambulating, voiding without a garcia, eating a regular diet, pain was well controlled. Her sodium remained within normal limits. Her urine output was elevated on POD1, but stabilized on POD2. She was evaluated by ENT and endocrinology teams who felt comfortable for her to discharge. She was discharged in stable condition.          Discharge Medications:     Current Discharge Medication List      START taking these medications    Details   !! hydrocortisone (CORTEF) 10 MG tablet Take 1 tablet (10 mg) by mouth every evening  Qty: 50 tablet, Refills: 1    Associated Diagnoses: Rathke's cleft cyst (H)      !! hydrocortisone (CORTEF) 20 MG tablet Take 2 tablets (40 mg) by  mouth once for 1 dose  Qty: 2 tablet, Refills: 0    Associated Diagnoses: Rathke's cleft cyst (H)      !! hydrocortisone (CORTEF) 20 MG tablet Take 1 tablet (20 mg) by mouth once for 1 dose  Qty: 1 tablet, Refills: 0    Associated Diagnoses: Primary intra-cranial tumor (H)      !! hydrocortisone (CORTEF) 20 MG tablet Take 1 tablet (20 mg) by mouth every morning  Qty: 50 tablet, Refills: 1    Associated Diagnoses: Rathke's cleft cyst (H)      pantoprazole (PROTONIX) 40 MG EC tablet Take 1 tablet (40 mg) by mouth every morning (before breakfast)  Qty: 60 tablet, Refills: 3    Associated Diagnoses: Rathke's cleft cyst (H)      polyethylene glycol (MIRALAX) powder Take 17 g (1 capful) by mouth daily  Qty: 510 g, Refills: 1    Associated Diagnoses: Rathke's cleft cyst (H)      sodium chloride (OCEAN NASAL SPRAY) 0.65 % nasal spray Spray 1 spray into both nostrils daily as needed for congestion  Qty: 2 Bottle, Refills: 3    Associated Diagnoses: Rathke's cleft cyst (H)       !! - Potential duplicate medications found. Please discuss with provider.      CONTINUE these medications which have NOT CHANGED    Details   multivitamin, therapeutic with minerals (MULTI-VITAMIN) TABS tablet Take 1 tablet by mouth daily                     Discharge Instructions and Follow-Up:   Discharge diet: Regular   Discharge activity: You may advance activity as tolerated. No strenuous exercise or heay lifting greater than 10 lbs for 4 weeks or until seen and cleared in clinic.   Discharge follow-up: Follow-up with Dr. Maria Luisa Oshea MD in 3 months with MRi brain w/wo contrast (sellar protocol)  Follow-up with Dr. Pritchett in ENT clinic on 5/3/18  Follow-up with Dr. Aldana in Endocrinology clinic in 2 weeks   Wound care/activity: After discharge, your activity restrictions are:   -We encourage short frequent walks, increasing as tolerated.  - No driving until you are seen in clinic and cleared by your neurosurgeon.   - No strenuous  activity.  - No lifting more than 10 pounds until you are seen in clinic (a gallon of milk weighs approximately 8 pounds)  - Do not blow your nose until seen and evaluated in the ENT clinic.   -Nasal precautions: no straining, no nose blowing, sneeze and cough with mouth open   - Avoid straining and/or constipation. Please take the medicines you were prescribed to help prevent constipation.   - You are ok to shower, but do not soak your incisions. Do not submerge your head in water. Pat them dry if they get damp.   - No baths, hot tubs or pools for 4-6 weeks after surgery.        Please call if you have:  1. increased pain, redness, drainage, swelling at your incision  2. fevers > 101.5 F degrees  3. with any questions or concerns.  You may reach the Neurosurgery clinic at [phone] during regular work hours. ER at [phone].    and ask for the Neurosurgery Resident on call at [phone], during off hours or weekends.         Discharge Disposition:   Discharged to home          -----------------------------------  Carolyn Kimball MD, MS  Neurosurgery PGY-1  Pager #[phone]

## 2018-04-22 NOTE — PLAN OF CARE
Problem: Patient Care Overview  Goal: Plan of Care/Patient Progress Review  Outcome: Improving  POD #2 endoscopic transnasal resection of cyst. Strict I&O, voids adequate amounts without difficulty. Midnight sodium check 142. Neuros intact. VSS on RA. Pelletier splint intact and nasal sling in place, independent with gauze changes. Denies PND and salty/metallic taste. PIV SL. Regular diet. Up independently. Compliant with nasal precautions. Continue to monitor.

## 2018-04-22 NOTE — CONSULTS
Inpatient Endocrinology Consult   Patient: Aurea Prasad   MRN: [id number]  Date of Service: 4/22/2018    Chief Complaint: We were asked to evaluate the patient at the request of Dr. Cordero for management of diabetes insipidus.    Assessment/Recs:    Cystic Sellar Mass - s/p surgical resection. Pre-operative hormone assessment demonstrated slightly elevated prolactin level (47) which may be stalk effect as well as mildly reduced serum cortisol but this was at 7 PM at night and with a normal to elevated ACTH level. She had a borderline low free T4 in the setting of normal TSH in 1/2018. Will repeat as outpatient.     Diabetes Inspidus - urine output slightly elevated overnight but improving. She is matching oral intake to UO today. She does not appear to need DDAVP at this time. We discussed the natural course which may be normalization, permanent DI, or even SIADH. She will monitor her urine output and monitor for fluid retention which may be a sing of SIADH which would require fluid restriction as the mainstay of treatment.    Discharge recommendations:  -continue Hydrocortisone 20 mg AM, 10 mg PM until f/u with endo  -check Na level on 4/23 or 4/24/18 to confirm stability (we have placed outpatient order)  -Na check 1 week (order also placed)  -RTC in 2 weeks to see Dr. Aldana in endocrinology  -Patient educated on hypo- and hypernatremia symptoms and on the need to seek medical attention if these develop      History of Present Illness:  Patient is a 27 year old woman with a history of elevated prolactin level, persistent galactorrhea, and evidence of cystic mass on MRI with mild mass effect on the optic apparatus. She underwent stealth guided endoscopic transnasal resection of the Rathke's Cleft Cyst. She is now post-op day #2. Operative note reports removal of pituitary tissue with small amount of clear fluid. Await pathology report. Overnight, she was noted to have a slightly low urine specific gravity of  1.002. Serum sodium high end of normal range. Review of I/O's shows that she is nearly matching PO intake to urine output. She produced 5.7 L of urine yesterday.      ROS:  11 point ROS as detailed in the HPI above or negative    Current Problem List:   Patient Active Problem List   Diagnosis     Galactorrhea of both breasts     Rathke's cleft cyst (H)       Past Medical and Past Surgical History:  Past Medical History:   Diagnosis Date     Cystic sellar brain mass (Rathke's cleft cyst)      History of cocaine use      Motion sickness      Obesity        Past Surgical History:   Procedure Laterality Date     DENTAL SURGERY      Reading teeth extraction.       Medications:   Current Facility-Administered Medications   Medication     [START ON 4/23/2018] acetaminophen (TYLENOL) tablet 650 mg     acetaminophen (TYLENOL) tablet 975 mg     hydrALAZINE (APRESOLINE) injection 10-20 mg     [START ON 4/23/2018] hydrocortisone (CORTEF) tablet 40 mg    And     [START ON 4/23/2018] hydrocortisone (CORTEF) tablet 20 mg    And     [START ON 4/24/2018] hydrocortisone (CORTEF) tablet 20 mg    And     [START ON 4/24/2018] hydrocortisone (CORTEF) tablet 10 mg     hydrocortisone sodium succinate PF (solu-CORTEF) injection 50 mg     labetalol (NORMODYNE/TRANDATE) injection 10-40 mg     lidocaine (LMX4) kit     lidocaine 1 % 1 mL     multivitamin, therapeutic with minerals (THERA-VIT-M) tablet 1 tablet     naloxone (NARCAN) injection 0.1-0.4 mg     ondansetron (ZOFRAN-ODT) ODT tab 4-8 mg    Or     ondansetron (ZOFRAN) injection 4-8 mg     pantoprazole (PROTONIX) EC tablet 40 mg     prochlorperazine (COMPAZINE) injection 10 mg    Or     prochlorperazine (COMPAZINE) tablet 10 mg     scopolamine (TRANSDERM) 72 hr patch 1 patch    And     scopolamine (TRANSDERM-SCOP) Patch in Place    And     [START ON 4/23/2018] scopolamine (TRANSDERM-SCOP) patch REMOVAL     senna-docusate (SENOKOT-S;PERICOLACE) 8.6-50 MG per tablet 1 tablet    Or      "senna-docusate (SENOKOT-S;PERICOLACE) 8.6-50 MG per tablet 2 tablet     sodium chloride (PF) 0.9% PF flush 3 mL     sodium chloride (PF) 0.9% PF flush 3 mL     No current outpatient prescriptions on file.       Allergies:   No Known Allergies    Social History:  Social History   Substance Use Topics     Smoking status: Never Smoker     Smokeless tobacco: Never Used     Alcohol use Yes      Comment: 8 servings a week of beer/wine/cocktail       Family History:  Family History   Problem Relation Age of Onset     Arthritis Mother      No Known Problems Father      No Known Problems Sister      Bipolar Disorder Brother      Breast Cancer Maternal Grandmother      No Known Problems Maternal Grandfather      DIABETES Paternal Grandmother      HEART DISEASE Paternal Grandmother      No Known Problems Brother      Myocardial Infarction Paternal Grandfather        Physical Examination:  Blood pressure 117/74, pulse 60, temperature 96.6  F (35.9  C), temperature source Axillary, resp. rate 14, height 1.676 m (5' 6\"), weight 83.8 kg (184 lb 11.2 oz), last menstrual period 04/20/2018, SpO2 97 %.  GEN: Awake, alert, no distress.  HEENT: EOMI.  NECK: Thyroid non-palpable, non-tender. No cervical or clavicular LAD.   CV: RRR with no murmur.   RESP: CTA bilaterally.   ABD: Soft, non-tender, and non-distended, with normal BS.   EXT: No peripheral edema.   SKIN: Normal skin temperature and turgor with no lesions.   NEURO: Non-focal.    Labs:   ENDO ADRENAL LABS Latest Ref Rng & Units 1/9/2018   CORTISOL, SERUM 4 - 22 ug/dL 3.5 (L)   ADRENAL CORTICOTROPIN <47 pg/mL 51 (H)   POTASSIUM 3.4 - 5.3 mmol/L 3.7    - 144 mmol/L 140   ENDO PITUITARY LABS-UMP Latest Ref Rng & Units 1/9/2018   TSH 0.40 - 4.00 mU/L 3.08   PROLACTIN 3 - 27 ug/L 47 (H)   CORTISOL, SERUM 4 - 22 ug/dL 3.5 (L)   ADRENAL CORTICOTROPIN <47 pg/mL 51 (H)   FSH IU/L 5.0   LUTROPIN IU/L 4.5    - 144 mmol/L 140   POTASSIUM 3.4 - 5.3 mmol/L 3.7   HUMAN GROWTH " HORMONE 0 - 8.0 ug/L 0.5   INS GROWTH FACTOR 1 98 - 305 ng/ml 210   GLUCOSE 70 - 99 mg/dL 93   GLUCOSE 70 - 99 mg/dL        Imaging:     Pituitary MRI 1/2018  T1 and T2 hyperintense sellar mass with suprasellar  extension measuring up to 1.8 cm in maximum height with mass effect on  the optic chiasm. There is adjacent enhancement on the left which  could represent residual pituitary gland versus other etiology. 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     Audi Gorman MD   Endocrinology Fellow  Pager: [phone]    Patient was discussed with attending physician, Dr Layne.    ATTENDING NOTE    I have seen and examined the patient, reviewed the pertinent data, reviewed and edited the fellow's note, and agree with the plan of care.    Soheila Layne MD PhD    Division of Endocrinology and Diabetes

## 2018-04-23 ENCOUNTER — CARE COORDINATION (OUTPATIENT)
Dept: CARE COORDINATION | Facility: CLINIC | Age: 28
End: 2018-04-23

## 2018-04-23 ENCOUNTER — TELEPHONE (OUTPATIENT)
Dept: NEUROSURGERY | Facility: CLINIC | Age: 28
End: 2018-04-23

## 2018-04-23 DIAGNOSIS — E23.6 RATHKE'S CLEFT CYST (H): ICD-10-CM

## 2018-04-23 DIAGNOSIS — E87.1 SODIUM (NA) DEFICIENCY: Primary | ICD-10-CM

## 2018-04-23 DIAGNOSIS — E23.2 DIABETES INSIPIDUS (H): ICD-10-CM

## 2018-04-23 DIAGNOSIS — D49.6: ICD-10-CM

## 2018-04-23 LAB — SODIUM SERPL-SCNC: 142 MMOL/L (ref 133–144)

## 2018-04-23 RX ORDER — HYDROCORTISONE 20 MG/1
20 TABLET ORAL ONCE
Qty: 1 TABLET | Refills: 0 | Status: SHIPPED | OUTPATIENT
Start: 2018-04-23 | End: 2018-04-23

## 2018-04-23 RX ORDER — HYDROCORTISONE 20 MG/1
40 TABLET ORAL ONCE
Qty: 2 TABLET | Refills: 0 | Status: SHIPPED | OUTPATIENT
Start: 2018-04-23 | End: 2018-04-23

## 2018-04-23 NOTE — PLAN OF CARE
Problem: Patient Care Overview  Goal: Plan of Care/Patient Progress Review  Physical Therapy Discharge Summary    Reason for therapy discharge:    Discharged to home.    Progress towards therapy goal(s). See goals on Care Plan in [facility] electronic health record for goal details.  Goals partially met.  Barriers to achieving goals:   discharge from facility.    Therapy recommendation(s):    Continue home exercise program.

## 2018-04-23 NOTE — TELEPHONE ENCOUNTER
Pt called because the pharmacy did not have all her hydrocortisone prescription.  When writer call the pharmacy, it had the prescription for the 10mg tablet quantity of 50 with a one time refill and the 20mg tablet quantity of 50 with one refill.  The pharmacy did not have the orders for the one time doses.  They were e-prescribed to [facility].      Post op follow up call.  Pt is using Tylenol to manage pain and her pain level is acceptable.  She has to push herself with eating.  She had a large BM this AM and has had no trouble emptying her bladder.  No troubles with ambulation.  She will call if she has any questions or concerns.

## 2018-04-23 NOTE — PLAN OF CARE
Problem: Patient Care Overview  Goal: Discharge Needs Assessment  Discharge instructions and medications reviewed with patient and family, both verbalized the understanding. Took all her belonging. All prescriptions medication sent to [facility] on [address] Denies having and metallic taste.

## 2018-04-24 ENCOUNTER — TELEPHONE (OUTPATIENT)
Dept: NEUROSURGERY | Facility: CLINIC | Age: 28
End: 2018-04-24

## 2018-04-24 NOTE — TELEPHONE ENCOUNTER
Writer got a message that their is something wrong with the hydrocortisone prescriptions and the prescriptions were sent to the wrong pharmacy.  Writer called the pharmacy.  The staff member of the pharmacy told the writer that the pt tried to  the 20mg tablets, dispense 50 tablets, too soon.  Today, the prescription for the 20mg tablets, dispense 50 was ready for the pt to .  Also, the staff member at the pharmacy told the writer there would be no problem transferring the scripts if the pt wanted to change location.  This information was relayed to the patient.  She did not seem happy with this news.

## 2018-04-25 LAB — COPATH REPORT: NORMAL

## 2018-04-26 NOTE — OP NOTE
Procedure Date: 04/20/2018      PREOPERATIVE DIAGNOSIS:  Sellar cyst.      POSTOPERATIVE DIAGNOSES:  Sellar cyst.      PROCEDURE:  Transnasal endoscopic approach to the sella region with drainage of sellar cyst.      SURGEON:  Mell Mccray MD and Maria Luisa Oshea MD      RESIDENT SURGEON:  Yanet Milner SA.      ANESTHESIA:  General with orotracheal tube.      ESTIMATED BLOOD LOSS:  250 mL.      COMPLICATIONS:  None.      CONDITION:  The patient was extubated and transferred to the recovery room under stable condition.      INDICATIONS:  Briefly, Aurea is a 27-year-old female who was recently diagnosed with a sellar cyst.  The patient had complaining of galactorrhea.  She denies any headaches, no visual changes.  MRI did demonstrate a large sellar cyst with compression of the chiasm.      FINDINGS:  Septum is in the midline.  Inferior middle turbinates are normal bilaterally.  There are multiple septations in the sphenoid sinus.  Please see Dr. Oshea for description of findings of the sellar cyst.      PROCEDURE:  All the risks and benefits of the procedure were explained to the patient and informed consent was obtained.      DESCRIPTION OF PROCEDURE:  The patient was taken to the operating room on 04/20/2018.  The patient was placed in the supine position.  General anesthesia was induced and orotracheal tube was placed with no difficulties.  The operating table was turned 180 degrees.  The patient was placed in the [place] head rosales by the Neurosurgery team.  Following this, the Stealth guidance system was set up and found to be very accurate.  Next, the patient was prepped and draped in the sterile fashion for a transnasal endoscopic approach to the sella region.  Next, timeout was performed by myself and the operating room staff.        Following this, I used a 0-degree nasal endoscope to gain access into the nasal cavity.  On initial inspection, the septum is in the midline.  Inferior  middle turbinates are bilaterally normal.  The nasopharynx is normal.  We did initiate our procedure with elevation of a right-sided nasal septal flap.  This flap was elevated in the usual fashion, was placed into the nasopharynx for further use.  We then did a posterior septectomy.  We removed the sphenoid rostrum to gain access into the sphenoid sinus.  We used a 4 mm marisa drill to continue drilling the remaining lower part of the sphenoid rostrum.  We removed the septations within the sphenoid sinus.  We then removed the sphenoid sinus mucosa.  We also resected the superior turbinates bilaterally in order to gain more lateral access into the sphenoid sinus.  We created a left-sided posterior septal flap that was rotated to the right to cover the defect on the septum.  This was secured with a 4-0 chromic.        Following this, I enlarged the sphenoid sinus as much as I could, superiorly, inferiorly, and laterally.  Next, I drilled the anterior sellar wall with a 4-mm marisa drill and removed all the rest of the bone using Kerrison forceps.  At this time, Dr. Oshea was called into the room.  Please see his operative note for more details on his portion of the procedure.  Once he finished, I did the reconstruction.  We used a small piece of Durepair that was placed as an over layer fashion between the bone and the dura, noticed that there was a small CSF leak that was repaired with a small piece of Surgicel.  After putting the piece of Durepair, no CSF leak was noted.  I then recovered the nasal septal flap from the nasopharynx and applied the nasal septal flap as an over layer fashion to cover the skull base defect.  This nasal septal flap was secured using small pieces of Surgicel, DuraSeal, and pieces of Gelfoam.  Next, I applied bilateral Pelletier splints to the septum.  The Pelletier splints were secured using 2-0 nylon.       At this time, I advanced a nasogastric tube into the stomach which suctioned the  gastric contents.  At this time, the procedure was ended.  The patient was awakened, transferred to recovery room in stable condition.  I was present for the entire length of the case.         MYRIAM GARCIA MD             D: 2018   T: 2018   MT: ALEXANDRA      Name:     SIERRA BOWER   MRN:      [id number]        Account:        [id number]   :      1990           Procedure Date: 2018      Document: [id number]

## 2018-04-27 LAB — COPATH REPORT: NORMAL

## 2018-05-03 ENCOUNTER — OFFICE VISIT (OUTPATIENT)
Dept: OTOLARYNGOLOGY | Facility: CLINIC | Age: 28
End: 2018-05-03
Payer: COMMERCIAL

## 2018-05-03 VITALS — HEIGHT: 66 IN | WEIGHT: 180 LBS | BODY MASS INDEX: 28.93 KG/M2

## 2018-05-03 DIAGNOSIS — D49.7 PITUITARY NEOPLASM: Primary | ICD-10-CM

## 2018-05-03 ASSESSMENT — PAIN SCALES - GENERAL: PAINLEVEL: NO PAIN (0)

## 2018-05-03 NOTE — PATIENT INSTRUCTIONS
1.  You were seen in the ENT Clinic today by Dr. Mccray.  If you have questions or concerns after your appointment please call, [phone].  Press option #1 for scheduling related needs.  Press option #3 for Nurse advice.  2.  Plan is to return to clinic in 6 weeks for another assessment  3. RN Care Coordinator is Luanne, [phone]  4. Please keep current activity and nasal restrictions for another 3 weeks.  5. Continue to use nasal saline several times daily

## 2018-05-03 NOTE — LETTER
May 3, 2018       TO: Aurea Prasad  [address] [address]         To whom it may concern,    Aurea Prasad is under my medical care for the treatment of her pituitary adenoma.  She underwent surgery for this on 4/20/18.  She was seen in the ENT clinic today and her recovery is going well. She is cleared to return to work on 5/7/18.  May not lift anything greater than 10 lbs until 5/24/18.      Thank you for your understanding of this matter.          Sincerely,               Mell Mccray M.D.

## 2018-05-03 NOTE — NURSING NOTE
Chief Complaint   Patient presents with     RECHECK     post op from 4/20     Bethany Bee Medical Assistant

## 2018-05-03 NOTE — MR AVS SNAPSHOT
After Visit Summary   5/3/2018    Aurea Prasad    MRN: [id number]           Patient Information     Date Of Birth          1990        Visit Information        Provider Department      5/3/2018 2:00 PM Mell Mccray MD [facility] Ear Nose [facility] Throat        Care Instructions    1.  You were seen in the ENT Clinic today by Dr. Mccray.  If you have questions or concerns after your appointment please call, [phone].  Press option #1 for scheduling related needs.  Press option #3 for Nurse advice.  2.  Plan is to return to clinic in 6 weeks for another assessment  3. RN Care Coordinator is Luanne, [phone]  4. Please keep current activity and nasal restrictions for another 3 weeks.  5. Continue to use nasal saline several times daily            Follow-ups after your visit        Follow-up notes from your care team     Return in about 6 weeks (around 6/14/2018) for s/p pituitary adenoma resection on 4/20/18.      Your next 10 appointments already scheduled     May 08, 2018  8:30 AM CDT   (Arrive by 8:15 AM)   RETURN ENDOCRINE with Karolyn Aldana MD   [facility] Endocrinology ([facility])    [address] [place] [address]  3rd Floor  [facility] [address] [phone]            Jun 14, 2018  4:20 PM CDT   (Arrive by 4:05 PM)   RETURN TUMOR VISIT with Mell Mccray MD   [facility]ose [facility] ([facility])    [address] [place] [address]  4th Floor  [facility] [id number] [phone]            Jul 17, 2018  7:00 AM CDT   MR BRAIN W/O & W CONTRAST with [id number]   [facility] MRI ([facility])    [address]  1st Floor  [facility] [id number] [phone]           Take your medicines as usual, unless your doctor tells you not to. Bring a list of your current medicines to your exam (including vitamins, minerals and over-the-counter drugs).  You may or may not  receive intravenous (IV) contrast for this exam pending the discretion of the Radiologist.  You do not need to do anything special to prepare.  The MRI machine uses a strong magnet. Please wear clothes without metal (snaps, zippers). A sweatsuit works well, or we may give you a hospital gown.  Please remove any body piercings and hair extensions before you arrive. You will also remove watches, jewelry, hairpins, wallets, dentures, partial dental plates and hearing aids. You may wear contact lenses, and you may be able to wear your rings. We have a safe place to keep your personal items, but it is safer to leave them at home.  **IMPORTANT** THE INSTRUCTIONS BELOW ARE ONLY FOR THOSE PATIENTS WHO HAVE BEEN PRESCRIBED SEDATION OR GENERAL ANESTHESIA DURING THEIR MRI PROCEDURE:  IF YOUR DOCTOR PRESCRIBED ORAL SEDATION (take medicine to help you relax during your exam):   You must get the medicine from your doctor (oral medication) before you arrive. Bring the medicine to the exam. Do not take it at home. You ll be told when to take it upon arriving for your exam.   Arrive one hour early. Bring someone who can take you home after the test. Your medicine will make you sleepy. After the exam, you may not drive, take a bus or take a taxi by yourself.  IF YOUR DOCTOR PRESCRIBED IV SEDATION:   Arrive one hour early. Bring someone who can take you home after the test. Your medicine will make you sleepy. After the exam, you may not drive, take a bus or take a taxi by yourself.   No eating 6 hours before your exam. You may have clear liquids up until 4 hours before your exam. (Clear liquids include water, clear tea, black coffee and fruit juice without pulp.)  IF YOUR DOCTOR PRESCRIBED ANESTHESIA (be asleep for your exam):   Arrive 1 1/2 hours early. Bring someone who can take you home after the test. You may not drive, take a bus or take a taxi by yourself.   No eating 8 hours before your exam. You may have clear liquids up until  "4 hours before your exam. (Clear liquids include water, clear tea, black coffee and fruit juice without pulp.)   You will spend four to five hours in the recovery room.  Please call the Imaging Department at your exam site with any questions.            2018  8:30 AM CDT   (Arrive by 8:15 AM)   Return Visit with Maria Luisa Oshea MD   [facility] Neurosurgery ([facility])    [address]  3rd [facility] [address] [phone]              Who to contact     Please call your clinic at [phone] to:    Ask questions about your health    Make or cancel appointments    Discuss your medicines    Learn about your test results    Speak to your doctor            Additional Information About Your Visit        MyChart Information     [employer] is an electronic gateway that provides easy, online access to your medical records. With [employer], you can request a clinic appointment, read your test results, renew a prescription or communicate with your care team.     To sign up for [employer] visit the website at [email or web address]   You will be asked to enter the access code listed below, as well as some personal information. Please follow the directions to create your username and password.     Your access code is: [email or web address]  Expires: 2018  6:31 AM     Your access code will  in 90 days. If you need help or a new code, please contact your [facility] or call [phone] for assistance.        Care EveryWhere ID     This is your Care EveryWhere ID. This could be used by other organizations to access your [facility] medical records  [id number]        Your Vitals Were     Height Last Period BMI (Body Mass Index)             1.676 m (5' 5.98\") 2018 29.07 kg/m2          Blood Pressure from Last 3 Encounters:   18 101/59   18 120/82   18 118/73    Weight from Last 3 Encounters:   18 81.6 kg (180 " lb)   04/21/18 83.8 kg (184 lb 11.2 oz)   04/12/18 81.6 kg (180 lb)              Today, you had the following     No orders found for display       Primary Care Provider Office Phone # Fax #    Clinic [facility] [phone]       [address] [facility] [address]        Equal Access to Services     DONELL GARZON : Hadii aad ku hadasho Soomaali, waaxda luqadaha, qaybta kaalmada adeegyada, waxay idiin hayaan adeeg bettykasie ladenia . So [facility] [phone].    ATENCIÓN: Si habla español, tiene a cheek disposición servicios gratuitos de asistencia lingüística. Eliumason al [phone].    We comply with applicable federal civil rights laws and [place] laws. We do not discriminate on the basis of race, color, national origin, age, disability, sex, sexual orientation, or gender identity.            Thank you!     Thank you for choosing [facility]  for your care. Our goal is always to provide you with excellent care. Hearing back from our patients is one way we can continue to improve our services. Please take a few minutes to complete the written survey that you may receive in the mail after your visit with us. Thank you!             Your Updated Medication List - Protect others around you: Learn how to safely use, store and throw away your medicines at www.disposemymeds.org.          This list is accurate as of 5/3/18  2:33 PM.  Always use your most recent med list.                   Brand Name Dispense Instructions for use Diagnosis    * hydrocortisone 20 MG tablet    CORTEF    50 tablet    Take 1 tablet (20 mg) by mouth every morning    Rathke's cleft cyst (H)       * hydrocortisone 10 MG tablet    CORTEF    50 tablet    Take 1 tablet (10 mg) by mouth every evening    Rathke's cleft cyst (H)       Multi-vitamin Tabs tablet      Take 1 tablet by mouth daily        pantoprazole 40 MG EC tablet    PROTONIX    60 tablet    Take 1 tablet (40 mg) by mouth every morning (before breakfast)     Rathke's cleft cyst (H)       polyethylene glycol powder    MIRALAX    510 g    Take 17 g (1 capful) by mouth daily    Rathke's cleft cyst (H)       sodium chloride 0.65 % nasal spray    OCEAN NASAL SPRAY    2 Bottle    Spray 1 spray into both nostrils daily as needed for congestion    Rathke's cleft cyst (H)       * Notice:  This list has 2 medication(s) that are the same as other medications prescribed for you. Read the directions carefully, and ask your doctor or other care provider to review them with you.

## 2018-05-03 NOTE — PROGRESS NOTES
DIAGNOSIS:  Pituitary adenoma.      TREATMENT:  The patient underwent transnasal endoscopic resection of a cystic lesion in the sellar region.  The final pathology report shows this to be pituitary adenoma.  Surgical procedure was on 04/20/2018 with a right-sided nasoseptal flap and intraoperative CSF leak.      HISTORY OF PRESENT ILLNESS:  Ms. Prasad is a 27-year-old female who is here for her first postoperative visit after transnasal endoscopic resection of pituitary adenoma.  The patient is doing well.  She is complaining of sinonasal congestion.  Other than that, doing well.  No other symptoms.      PHYSICAL EXAMINATION:    Constitutional: The patient was well-groomed and in no acute distress.    Skin: Warm and pink.    Neurologic: Alert and oriented x3. Cranial nerves III-XII within normal limits. Voice quality is normal.    Psychiatric: The patient's affect was calm, cooperative and appropriate.    Respiratory: Breathing comfortably without stridor or exertion of accessory muscles.    Eyes: Pupils were equal and reactive. Extraocular movements are intact.    Head: Normocephalic and atraumatic. No lesions or scars.    Ears: External auditory canals and tympanic membranes were clear.    Nose: Sinuses were nontender. Anterior rhinoscopy revealed midline septum and absence of purulence or polyps.    Oral cavity/Oropharynx: Normal tongue, floor of mouth, buccal mucosa and palate. No lesions or masses on inspection or palpation. No abnormal lymph tissue in the oropharynx.    Neck: The parotids are soft without masses. Supple with normal laryngeal and tracheal landmarks.    Lymphatic: There is no palpable lymphadenopathy or other masses in the neck or parotids.       PROCEDURE: Nasal endoscopy: The risks and benefits of the procedure were discussed and written consent was obtained. A timeout was performed. The patient's nose was sprayed with lidocaine and Afrin, and a 0-degree nasal endoscope was used to gain access  into the nasal cavity. I removed the Pelletier splints.  Septum on the right side is still exposed.  There is a rotational flap covering part of the donor site.  The posterior septectomy is nice and clean.  The sphenoid sinus contents were suctioned.  No evidence of CSF leak.  No evidence of purulence.      ASSESSMENT AND PLAN:  This is a 27-year-old female status post transnasal endoscopic resection of pituitary macroadenoma, doing well.  I plan to see her back in six weeks.

## 2018-05-03 NOTE — LETTER
5/3/2018       RE: Aurea Prasad  [address] [address]     Dear Colleague,    Thank you for referring your patient, Aurea Prasad, to the [facility] at [facility]. Please see a copy of my visit note below.    DIAGNOSIS:  Pituitary adenoma.      TREATMENT:  The patient underwent transnasal endoscopic resection of a cystic lesion in the sellar region.  The final pathology report shows this to be pituitary adenoma.  Surgical procedure was on 04/20/2018 with a right-sided nasoseptal flap and intraoperative CSF leak.      HISTORY OF PRESENT ILLNESS:  Ms. Prasad is a 27-year-old female who is here for her first postoperative visit after transnasal endoscopic resection of pituitary adenoma.  The patient is doing well.  She is complaining of sinonasal congestion.  Other than that, doing well.  No other symptoms.      PHYSICAL EXAMINATION:    Constitutional: The patient was well-groomed and in no acute distress.    Skin: Warm and pink.    Neurologic: Alert and oriented x3. Cranial nerves III-XII within normal limits. Voice quality is normal.    Psychiatric: The patient's affect was calm, cooperative and appropriate.    Respiratory: Breathing comfortably without stridor or exertion of accessory muscles.    Eyes: Pupils were equal and reactive. Extraocular movements are intact.    Head: Normocephalic and atraumatic. No lesions or scars.    Ears: External auditory canals and tympanic membranes were clear.    Nose: Sinuses were nontender. Anterior rhinoscopy revealed midline septum and absence of purulence or polyps.    Oral cavity/Oropharynx: Normal tongue, floor of mouth, buccal mucosa and palate. No lesions or masses on inspection or palpation. No abnormal lymph tissue in the oropharynx.    Neck: The parotids are soft without masses. Supple with normal laryngeal and tracheal landmarks.    Lymphatic: There is no palpable lymphadenopathy or other  masses in the neck or parotids.       PROCEDURE: Nasal endoscopy: The risks and benefits of the procedure were discussed and written consent was obtained. A timeout was performed. The patient's nose was sprayed with lidocaine and Afrin, and a 0-degree nasal endoscope was used to gain access into the nasal cavity. I removed the Pelletier splints.  Septum on the right side is still exposed.  There is a rotational flap covering part of the donor site.  The posterior septectomy is nice and clean.  The sphenoid sinus contents were suctioned.  No evidence of CSF leak.  No evidence of purulence.      ASSESSMENT AND PLAN:  This is a 27-year-old female status post transnasal endoscopic resection of pituitary macroadenoma, doing well.  I plan to see her back in six weeks.           Again, thank you for allowing me to participate in the care of your patient.      Sincerely,    Mell Mccray MD

## 2018-05-07 DIAGNOSIS — D49.6: ICD-10-CM

## 2018-05-07 DIAGNOSIS — E87.1 SODIUM (NA) DEFICIENCY: ICD-10-CM

## 2018-05-07 LAB
ABO + RH BLD: NORMAL
ABO + RH BLD: NORMAL
ALBUMIN UR-MCNC: NEGATIVE MG/DL
ANION GAP SERPL CALCULATED.3IONS-SCNC: 7 MMOL/L (ref 3–14)
APPEARANCE UR: ABNORMAL
APTT PPP: 28 SEC (ref 22–37)
BACTERIA #/AREA URNS HPF: ABNORMAL /HPF
BASOPHILS # BLD AUTO: 0 10E9/L (ref 0–0.2)
BASOPHILS NFR BLD AUTO: 0.3 %
BILIRUB UR QL STRIP: NEGATIVE
BLD GP AB SCN SERPL QL: NORMAL
BLOOD BANK CMNT PATIENT-IMP: NORMAL
BUN SERPL-MCNC: 7 MG/DL (ref 7–30)
CALCIUM SERPL-MCNC: 8.8 MG/DL (ref 8.5–10.1)
CHLORIDE SERPL-SCNC: 108 MMOL/L (ref 94–109)
CO2 SERPL-SCNC: 28 MMOL/L (ref 20–32)
COLOR UR AUTO: YELLOW
CREAT SERPL-MCNC: 0.77 MG/DL (ref 0.52–1.04)
DIFFERENTIAL METHOD BLD: ABNORMAL
EOSINOPHIL # BLD AUTO: 0.1 10E9/L (ref 0–0.7)
EOSINOPHIL NFR BLD AUTO: 1.5 %
ERYTHROCYTE [DISTWIDTH] IN BLOOD BY AUTOMATED COUNT: 13.4 % (ref 10–15)
GFR SERPL CREATININE-BSD FRML MDRD: 90 ML/MIN/1.7M2
GLUCOSE SERPL-MCNC: 91 MG/DL (ref 70–99)
GLUCOSE UR STRIP-MCNC: NEGATIVE MG/DL
HCT VFR BLD AUTO: 33.9 % (ref 35–47)
HGB BLD-MCNC: 11 G/DL (ref 11.7–15.7)
HGB UR QL STRIP: ABNORMAL
IMM GRANULOCYTES # BLD: 0.1 10E9/L (ref 0–0.4)
IMM GRANULOCYTES NFR BLD: 0.6 %
INR PPP: 1.15 (ref 0.86–1.14)
KETONES UR STRIP-MCNC: NEGATIVE MG/DL
LEUKOCYTE ESTERASE UR QL STRIP: ABNORMAL
LYMPHOCYTES # BLD AUTO: 3.4 10E9/L (ref 0.8–5.3)
LYMPHOCYTES NFR BLD AUTO: 39.3 %
MCH RBC QN AUTO: 30.4 PG (ref 26.5–33)
MCHC RBC AUTO-ENTMCNC: 32.4 G/DL (ref 31.5–36.5)
MCV RBC AUTO: 94 FL (ref 78–100)
MONOCYTES # BLD AUTO: 0.7 10E9/L (ref 0–1.3)
MONOCYTES NFR BLD AUTO: 8.5 %
MUCOUS THREADS #/AREA URNS LPF: PRESENT /LPF
NEUTROPHILS # BLD AUTO: 4.3 10E9/L (ref 1.6–8.3)
NEUTROPHILS NFR BLD AUTO: 49.8 %
NITRATE UR QL: POSITIVE
NRBC # BLD AUTO: 0 10*3/UL
NRBC BLD AUTO-RTO: 0 /100
PH UR STRIP: 6 PH (ref 5–7)
PLATELET # BLD AUTO: 202 10E9/L (ref 150–450)
POTASSIUM SERPL-SCNC: 3.7 MMOL/L (ref 3.4–5.3)
RBC # BLD AUTO: 3.62 10E12/L (ref 3.8–5.2)
RBC #/AREA URNS AUTO: 7 /HPF (ref 0–2)
SODIUM SERPL-SCNC: 143 MMOL/L (ref 133–144)
SOURCE: ABNORMAL
SP GR UR STRIP: 1.01 (ref 1–1.03)
SPECIMEN EXP DATE BLD: NORMAL
SQUAMOUS #/AREA URNS AUTO: 9 /HPF (ref 0–1)
UROBILINOGEN UR STRIP-MCNC: 0 MG/DL (ref 0–2)
WBC # BLD AUTO: 8.6 10E9/L (ref 4–11)
WBC #/AREA URNS AUTO: 72 /HPF (ref 0–5)

## 2018-05-08 ENCOUNTER — OFFICE VISIT (OUTPATIENT)
Dept: ENDOCRINOLOGY | Facility: CLINIC | Age: 28
End: 2018-05-08
Payer: COMMERCIAL

## 2018-05-08 VITALS
WEIGHT: 182.4 LBS | HEART RATE: 90 BPM | SYSTOLIC BLOOD PRESSURE: 112 MMHG | HEIGHT: 66 IN | BODY MASS INDEX: 29.32 KG/M2 | DIASTOLIC BLOOD PRESSURE: 75 MMHG

## 2018-05-08 DIAGNOSIS — E23.6 RATHKE'S CLEFT CYST (H): Primary | ICD-10-CM

## 2018-05-08 DIAGNOSIS — D49.7 PITUITARY TUMOR: ICD-10-CM

## 2018-05-08 DIAGNOSIS — N64.3 GALACTORRHEA: ICD-10-CM

## 2018-05-08 DIAGNOSIS — R79.89 ELEVATED PROLACTIN LEVEL: ICD-10-CM

## 2018-05-08 ASSESSMENT — PAIN SCALES - GENERAL: PAINLEVEL: NO PAIN (0)

## 2018-05-08 NOTE — PROGRESS NOTES
- Endocrinology Initial Consultation -    Reason for visit/consult:  Rathke's cleft cyst post TSS follow up    Primary care provider: [facility]    HPI:  Ms. Aurea Prasad is a 27 yo F with hx of Rathke's cleft cyst/adenoma and galactorrhea who presents post 4/20/18 transnasal endoscopic resection of cystic lesion in sellar region. Surgery completed by Dr. Mccray of ENT and Dr. Oshea. Final pathology demonstrated pituitary adenoma.     She followed up with Dr. Mccray on 5/3 and has follow-up scheduled with Dr. Oshea. She first noted bilateral expressed galactorrhea in January 2017. This continued and at first she attributed this to her NuvaRing birth control. She switched to copper IUD and galactorrhea persisted. Labs done with PCP demonstrated elevated prolactin and an MRI scan was ordered which showed a cystic mass with mild mass effect on optic apparatus. She did not see endocrinology prior to operation.     She reports that she has never had visual symptoms. She did have some epistaxis following operation, this went away after first few days. Denies post-op change in hydration habits, nocturia, change in urinary frequency during the day. She at first felt she thought a little slower post op due to being tired, this has resolved. She still has galactorrhea if trying to express from nipples bilaterally. She continues on hydrocortisone: 10mg qAM and 20mg qPM. She has no concerns or compalints following surgery.     Past Medical/Surgical History:  Past Medical History:   Diagnosis Date     Cystic sellar brain mass (Rathke's cleft cyst)      History of cocaine use      Motion sickness      Obesity      Past Surgical History:   Procedure Laterality Date     DENTAL SURGERY      [place] teeth extraction.       OPTICAL TRACKING SYSTEM ENDOSCOPIC RESECTION TUMOR CRANIAL N/A 4/20/2018    Procedure: OPTICAL TRACKING SYSTEM  "ENDOSCOPIC RESECTION TUMOR CRANIAL;  Stealth Guided Endoscopic Transnasal Resection of Rathke's Cleft Cyst ;  Surgeon: Maria Luisa Oshea MD;  Location: [facility] OR       Allergies:  No Known Allergies    Current Medications   Current Outpatient Prescriptions   Medication     hydrocortisone (CORTEF) 10 MG tablet     hydrocortisone (CORTEF) 20 MG tablet     multivitamin, therapeutic with minerals (MULTI-VITAMIN) TABS tablet     pantoprazole (PROTONIX) 40 MG EC tablet     polyethylene glycol (MIRALAX) powder     sodium chloride (OCEAN NASAL SPRAY) 0.65 % nasal spray     No current facility-administered medications for this visit.        Family History: No family hx of thyroid, pituitary disorders.   Family History   Problem Relation Age of Onset     Arthritis Mother      No Known Problems Father      No Known Problems Sister      Bipolar Disorder Brother      Breast Cancer Maternal Grandmother      No Known Problems Maternal Grandfather      DIABETES Paternal Grandmother      HEART DISEASE Paternal Grandmother      No Known Problems Brother      Myocardial Infarction Paternal Grandfather        Social History:  Social History   Substance Use Topics     Smoking status: Never Smoker     Smokeless tobacco: Never Used     Alcohol use Yes      Comment: 8 servings a week of beer/wine/cocktail   Works in payroll at a company    ROS:  Full review of systems taken with the help of the intake sheet. Otherwise a complete 14 point review of systems was taken and is negative unless stated in the history above.      Physical Exam:   Blood pressure 112/75, pulse 90, height 1.676 m (5' 5.98\"), weight 82.7 kg (182 lb 6.4 oz), last menstrual period 04/20/2018.    General: well appearing, no acute distress, pleasant and conversant,   Mental Status/neuro: alert and oriented  Face: symmetrical, normal facial color  Eyes: anicteric, EOMI   Visual field: grossly intact  Neck: suppler, no lymphadenopathy  Thyroid: normal size and " texture, no nodule palpable  Breast: galactorrhea (left>right)  Heart: regular rhythm, normal S1S2, no murmur appreciated  Lung: clear to auscultation bilaterally  Legs: no swelling or edema  Neuro: 2+ patellar reflexes bilaterally    Labs : I reviewed data from epic and extract and summarize the pertinent data here.        1/9/2018 19:00 1/9/2018 19:01 1/12/2018 07:30   Prolactin  47 (H) 47   T4 Free  0.79    TSH  3.08    FSH  5.0    Cortisol Serum 3.5 (L)  11.6   Adrenal Corticotropin  51 (H) 65 (H)   Ins Growth Factor 1  210    Lutropin  4.5      Lab Results   Component Value Date     05/07/2018      Lab Results   Component Value Date    POTASSIUM 3.7 05/07/2018     Lab Results   Component Value Date    CHLORIDE 108 05/07/2018     Lab Results   Component Value Date    LIZY 8.8 05/07/2018     Lab Results   Component Value Date    CO2 28 05/07/2018     Lab Results   Component Value Date    BUN 7 05/07/2018     Lab Results   Component Value Date    CR 0.77 05/07/2018     Lab Results   Component Value Date    GLC 91 05/07/2018     Lab Results   Component Value Date    TSH 3.08 01/09/2018     Lab Results   Component Value Date    T4 0.79 01/09/2018     No results found for: A1C    No results found for: IGF1  Lab Results   Component Value Date    LH 4.5 01/09/2018     Lab Results   Component Value Date    FSH 5.0 01/09/2018     No results found for: TESTOSTFREE  No results found for: ESTROGEN  Lab Results   Component Value Date    PROLACTIN 47 01/12/2018     No results found for: PROG      MRI Brain: I personally reviewed the original images and agree with the below reports.   Results for orders placed in visit on 12/30/17   MR Brain w/o & w Contrast    Narrative Brain/ Pituitary MRI without and with contrast    History: Hyperprolactinemia (H); Hyperprolactinemia (H).  ICD-10: Hyperprolactinemia (H)    Comparison:  None     Technique: Axial diffusion and FLAIR images of the whole brain  obtained without  intravenous contrast. Sagittal T1 and T2-weighted,  coronal T2-weighted, coronal T1-weighted images with focus on the  sella were obtained without intravenous contrast. Post intravenous  contrast using gadolinium coronal and sagittal T1-weighted images were  obtained focused on the sella. Dynamic postcontrast coronal  T1-weighted images were also obtained.    Contrast: 9cc's Gadavist    Findings:    Well-defined rounded T1 and T2 high signal intensity mass measuring up  to 1.8 cm in maximum height is seen in the sella with suprasellar  extension. There is no significant gain in signal intensity post  contrast in the mass. There is contrast enhancement on the left  adjacent to the cystic mass which could represent residual pituitary  versus other etiology. The pituitary stalk is displaced to the left  and elevated. There is elevation and compression of the optic  chiasm.The major cavernous carotid vascular flow-voids appear patent.       FLAIR images through the whole brain are unremarkable, and demonstrate  no mass effect, midline shift, or significant enlargement of the  ventricles. There is polypoidal mucosal thickening noted involving  both maxillary sinuses. Otherwise paranasal sinuses, both orbits and  mastoids appear unremarkable      Impression Impression: T1 and T2 hyperintense sellar mass with suprasellar  extension measuring up to 1.8 cm in maximum height with mass effect on  the optic chiasm. There is adjacent enhancement on the left which  could represent residual pituitary gland versus other etiology. 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    I have personally reviewed the examination and initial interpretation  and I agree with the findings.    SHABBIR COLLIER MD           Pathology report 4/20/2018    For improved result formatting, select 'View Enhanced Report Format' under    Linked Documents  "section.     SPECIMEN(S):   A: No specimen rec'd from OR for A   B: Sellar cyst     FINAL DIAGNOSIS:   B. Pituitary fossa, designated \"Sellar cyst\", excision biopsy:      - PITUITARY ADENOMA, FRAGMENTS OF WELL-DIFFERENTIATED EPITHELIUM AND   FOCAL FOREIGN BODY GIANT CELLS, SEE   COMMENT     COMMENT:   The tissue predominantly encompasses a pituitary neoplasm that is   confirmed with synaptophysin immunostaining.   Additionally, the fragments of well-differentiated epithelium demonstrate   the presence of cystic remnants such   as a Rathke cleft cyst. Furthermore, the presence of foreign body giant   cells is unclear but may be suggestive   of a ruptured cyst. The case was reviewed with Pdero Tran,   neuropathologist who agrees with the above   diagnosis.     I have personally reviewed all specimens and/or slides, including the   listed special stains, and used them   with my medical judgement to determine or confirm the final diagnosis.     Electronically signed out by:     Leroy Mitchell M.D., [facility]     CLINICAL HISTORY:   Per the medical record, the patient is a 27 year old woman who presents   for management of a 1.8 cm cyst sellar   mass with optic nerve compression, as well as galactorrhea with an   elevated prolactin level. MRI  of the brain   in December 2017 identified the sellar mass with concern for mass effect   on the optic chiasm. She now   undergoes excision of the cystic sellar mass.   GROSS:   A: No specimen received to surgical pathology.     B:  The specimen is received in formalin with proper patient   identification, labeled \"sellar cyst\".  The   specimen consists of multiple red-brown soft tissue fragments 0.2-0.4 cm   in greatest dimension.  The specimen   is placed in sponges and entirely submitted in cassette B1. (Dictated by:   Keara Frost [facility] 4/23/2018 12:09   PM)     MICROSCOPIC:   B. Examination discloses multiple fragments of tissue including a   pituitary neoplasm, a " fragment of   adenohypophysis, dense fibroconnective tissue and small fragments of   well-differentiated epithelium.     The predominant portion of the specimen is a pituitary neoplasm with a   monomorphic population of cells with   abundant eosinophilic cytoplasm. Mitotic activity is inconspicuous.   Reticulin staining demonstrates loss of   pericellular reticulin in neoplastic cells. Immunostaining with   appropriate controls for synaptophysin and   CAM5.2 is diffusely positive in neoplastic cells. Immunostaining for   prolactin with appropriate controls shows   focal patchy positive staining in neoplastic cells.     Fragments of dense fibrocellular connective tissue is present with focal   foreign body giant cells containing   cholesterol clefts and focal hemosiderin. In other areas, islands of the   pituitary infiltrate the   fibroconnective tissue.     A few small fragments of well-differentiated epithelium are identified   near the edge of adenohypophysis, where   immunostaining for CK7 with appropriate controls is diffusely positive in   epithelial cells. Special staining   with PAS show no goblet cells.     CPT Codes:   B: 08571-ZZ8, 18188-IJTTR, 06907-DMN, 16409-ZRQ, 74798-IQB, 34098-YAIBH.T,    99997-IZZ.P, 31764-RUBR         Assessment and Plan  29 yo F with over one year hx of galactorrhea presents post 4/20/18 endoscopic transnasal resection of Rathke's cleft cyst found to be an adenoma on pathology. She is doing well post-op without any sodium derangements or symptoms concerning for pituitary dysfunction.     1. We discussed taper of hydrocortisone and that she should monitor for weakness/change in energy.   Taper:   - 5/8: 20mg qAM / 5mg pm  - 5/9: 10mg qAM/ 5mg pm  - 5/12: 5mg q AM/ 5 mg pm  - 5/15: 5mg qAM/ 0 mg in PM   - 5/18: off.     2. Return for bloodwork week of 5/21 - 5/25: Cortisol, TSH, free T4, prolactin ordered    Call clinic if feeling unwell or any further questions. Seen and staffed  with Dr. Aldana.   Veronika Jaimes   PGY4 Med Peds [phone].        --- Addendum----  I saw the patient with resident Dr. Jaimes and directly examined patient and discussed. Agree above note and plan.   Still galactorrhea+    We spent 35 minutes with this patient face to face and explained the conditions and plans (more than 50% of time was counseling/coordination of care, taper plan of steroid, blood work of pituitary panel planned, explained symptoms of adrenal insufficiency) . The patient understood and is satisfied with today's visit. Return to clinic with me in 6 month.     Karolyn Aldana MD  Staff Physician  Endocrinology and Metabolism  [facility]  License: [id number]  Pager: [phone]

## 2018-05-08 NOTE — MR AVS SNAPSHOT
After Visit Summary   5/8/2018    Aurea Prasad    MRN: [id number]           Patient Information     Date Of Birth          1990        Visit Information        Provider Department      5/8/2018 8:30 AM Karolyn Aldana MD [facility]        Today's Diagnoses     Rathke's cleft cyst (H)    -  1    Galactorrhea        Elevated prolactin level (H)        Pituitary tumor           Follow-ups after your visit        Follow-up notes from your care team     Return in about 6 months (around 11/8/2018).      Your next 10 appointments already scheduled     Jun 14, 2018  4:20 PM CDT   (Arrive by 4:05 PM)   RETURN TUMOR VISIT with Mell Mccray MD   [facility] Nose and Throat ([facility])    [address]  4th Floor  [facility] [id number] [phone]            Jul 17, 2018  7:00 AM CDT   MR BRAIN W/O & W CONTRAST with [id number]   [facility] MRI ([facility])    [address]  1st Floor  [facility] [id number] [phone]           Take your medicines as usual, unless your doctor tells you not to. Bring a list of your current medicines to your exam (including vitamins, minerals and over-the-counter drugs).  You may or may not receive intravenous (IV) contrast for this exam pending the discretion of the Radiologist.  You do not need to do anything special to prepare.  The MRI machine uses a strong magnet. Please wear clothes without metal (snaps, zippers). A sweatsuit works well, or we may give you a hospital gown.  Please remove any body piercings and hair extensions before you arrive. You will also remove watches, jewelry, hairpins, wallets, dentures, partial dental plates and hearing aids. You may wear contact lenses, and you may be able to wear your rings. We have a safe place to keep your personal items, but it is safer to leave them at home.  **IMPORTANT** THE INSTRUCTIONS BELOW ARE ONLY  FOR THOSE PATIENTS WHO HAVE BEEN PRESCRIBED SEDATION OR GENERAL ANESTHESIA DURING THEIR MRI PROCEDURE:  IF YOUR DOCTOR PRESCRIBED ORAL SEDATION (take medicine to help you relax during your exam):   You must get the medicine from your doctor (oral medication) before you arrive. Bring the medicine to the exam. Do not take it at home. You ll be told when to take it upon arriving for your exam.   Arrive one hour early. Bring someone who can take you home after the test. Your medicine will make you sleepy. After the exam, you may not drive, take a bus or take a taxi by yourself.  IF YOUR DOCTOR PRESCRIBED IV SEDATION:   Arrive one hour early. Bring someone who can take you home after the test. Your medicine will make you sleepy. After the exam, you may not drive, take a bus or take a taxi by yourself.   No eating 6 hours before your exam. You may have clear liquids up until 4 hours before your exam. (Clear liquids include water, clear tea, black coffee and fruit juice without pulp.)  IF YOUR DOCTOR PRESCRIBED ANESTHESIA (be asleep for your exam):   Arrive 1 1/2 hours early. Bring someone who can take you home after the test. You may not drive, take a bus or take a taxi by yourself.   No eating 8 hours before your exam. You may have clear liquids up until 4 hours before your exam. (Clear liquids include water, clear tea, black coffee and fruit juice without pulp.)   You will spend four to five hours in the recovery room.  Please call the Imaging Department at your exam site with any questions.            Jul 17, 2018  8:30 AM CDT   (Arrive by 8:15 AM)   Return Visit with Maria Luisa Oshea MD   [facility]surgery ([facility])    [address] [place] [id number] [phone]              Future tests that were ordered for you today     Open Future Orders        Priority Expected Expires Ordered    Cortisol Routine  5/8/2019 5/8/2018    TSH Routine  5/8/2019  "2018    T4 free Routine  2019    Prolactin Routine  2019            Who to contact     Please call your clinic at [phone] to:    Ask questions about your health    Make or cancel appointments    Discuss your medicines    Learn about your test results    Speak to your doctor            Additional Information About Your Visit        MyChart Information     [employer] is an electronic gateway that provides easy, online access to your medical records. With [facility], you can request a clinic appointment, read your test results, renew a prescription or communicate with your care team.     To sign up for [facility] visit the website at [email or web address]   You will be asked to enter the access code listed below, as well as some personal information. Please follow the directions to create your username and password.     Your access code is: [email or web address]  Expires: 2018  6:31 AM     Your access code will  in 90 days. If you need help or a new code, please contact your [facility] or call [phone] for assistance.        Care EveryWhere ID     This is your Care EveryWhere ID. This could be used by other organizations to access your [place] medical records  [id number]        Your Vitals Were     Pulse Height Last Period BMI (Body Mass Index)          90 1.676 m (5' 5.98\") 2018 29.45 kg/m2         Blood Pressure from Last 3 Encounters:   18 112/75   18 101/59   18 120/82    Weight from Last 3 Encounters:   18 82.7 kg (182 lb 6.4 oz)   18 81.6 kg (180 lb)   18 83.8 kg (184 lb 11.2 oz)               Primary Care Provider Office Phone # Fax #    Clinic [facility] [phone]        [facility] [address]        Equal Access to Services     DONELL GARZON AH: Unruly johnsono Sosaw, waaxda luqadaha, qaybta kaalmada jakob, elidia hill. So [facility] " [phone].    ATENCIÓN: Si leonora mckinney, tiene a cheek disposición servicios gratuitos de asistencia lingüística. Law harley [phone].    We comply with applicable federal civil rights laws and [place] laws. We do not discriminate on the basis of race, color, national origin, age, disability, sex, sexual orientation, or gender identity.            Thank you!     Thank you for choosing [facility]  for your care. Our goal is always to provide you with excellent care. Hearing back from our patients is one way we can continue to improve our services. Please take a few minutes to complete the written survey that you may receive in the mail after your visit with us. Thank you!             Your Updated Medication List - Protect others around you: Learn how to safely use, store and throw away your medicines at www.disposemymeds.org.          This list is accurate as of 5/8/18 11:59 PM.  Always use your most recent med list.                   Brand Name Dispense Instructions for use Diagnosis    * hydrocortisone 20 MG tablet    CORTEF    50 tablet    Take 1 tablet (20 mg) by mouth every morning    Rathke's cleft cyst (H)       * hydrocortisone 10 MG tablet    CORTEF    50 tablet    Take 1 tablet (10 mg) by mouth every evening    Rathke's cleft cyst (H)       Multi-vitamin Tabs tablet      Take 1 tablet by mouth daily        pantoprazole 40 MG EC tablet    PROTONIX    60 tablet    Take 1 tablet (40 mg) by mouth every morning (before breakfast)    Rathke's cleft cyst (H)       polyethylene glycol powder    MIRALAX    510 g    Take 17 g (1 capful) by mouth daily    Rathke's cleft cyst (H)       sodium chloride 0.65 % nasal spray    OCEAN NASAL SPRAY    2 Bottle    Spray 1 spray into both nostrils daily as needed for congestion    Rathke's cleft cyst (H)       * Notice:  This list has 2 medication(s) that are the same as other medications prescribed for you. Read the directions carefully, and ask your doctor or  other care provider to review them with you.

## 2018-05-08 NOTE — LETTER
5/8/2018       RE: Aurea Prasad  [address]  [facility] [address]     Dear Colleague,    Thank you for referring your patient, Aurea Prasad, to the [facility] ENDOCRINOLOGY at [facility]. Please see a copy of my visit note below.                                                                               - Endocrinology Initial Consultation -    Reason for visit/consult:  Rathke's cleft cyst post TSS follow up    Primary care provider: [facility]    HPI:  Ms. Aurea Prasad is a 27 yo F with hx of Rathke's cleft cyst/adenoma and galactorrhea who presents post 4/20/18 transnasal endoscopic resection of cystic lesion in sellar region. Surgery completed by Dr. Mccray of ENT and Dr. Oshea. Final pathology demonstrated pituitary adenoma.     She followed up with Dr. Mccray on 5/3 and has follow-up scheduled with Dr. Oshea. She first noted bilateral expressed galactorrhea in January 2017. This continued and at first she attributed this to her NuvaRing birth control. She switched to copper IUD and galactorrhea persisted. Labs done with PCP demonstrated elevated prolactin and an MRI scan was ordered which showed a cystic mass with mild mass effect on optic apparatus. She did not see endocrinology prior to operation.     She reports that she has never had visual symptoms. She did have some epistaxis following operation, this went away after first few days. Denies post-op change in hydration habits, nocturia, change in urinary frequency during the day. She at first felt she thought a little slower post op due to being tired, this has resolved. She still has galactorrhea if trying to express from nipples bilaterally. She continues on hydrocortisone: 10mg qAM and 20mg qPM. She has no concerns or compalints following surgery.     Past Medical/Surgical History:  Past Medical History:   Diagnosis Date     Cystic sellar brain mass (Rathke's cleft cyst)       "History of cocaine use      Motion sickness      Obesity      Past Surgical History:   Procedure Laterality Date     DENTAL SURGERY      [place] teeth extraction.       OPTICAL TRACKING SYSTEM ENDOSCOPIC RESECTION TUMOR CRANIAL N/A 4/20/2018    Procedure: OPTICAL TRACKING SYSTEM ENDOSCOPIC RESECTION TUMOR CRANIAL;  Stealth Guided Endoscopic Transnasal Resection of Rathke's Cleft Cyst ;  Surgeon: Maria Luisa Oshea MD;  Location:  OR     Allergies:  No Known Allergies    Current Medications   Current Outpatient Prescriptions   Medication     hydrocortisone (CORTEF) 10 MG tablet     hydrocortisone (CORTEF) 20 MG tablet     multivitamin, therapeutic with minerals (MULTI-VITAMIN) TABS tablet     pantoprazole (PROTONIX) 40 MG EC tablet     polyethylene glycol (MIRALAX) powder     sodium chloride (OCEAN NASAL SPRAY) 0.65 % nasal spray     No current facility-administered medications for this visit.      Family History: No family hx of thyroid, pituitary disorders.   Family History   Problem Relation Age of Onset     Arthritis Mother      No Known Problems Father      No Known Problems Sister      Bipolar Disorder Brother      Breast Cancer Maternal Grandmother      No Known Problems Maternal Grandfather      DIABETES Paternal Grandmother      HEART DISEASE Paternal Grandmother      No Known Problems Brother      Myocardial Infarction Paternal Grandfather      Social History:  Social History   Substance Use Topics     Smoking status: Never Smoker     Smokeless tobacco: Never Used     Alcohol use Yes      Comment: 8 servings a week of beer/wine/cocktail   Works in payroll at a company    ROS:  Full review of systems taken with the help of the intake sheet. Otherwise a complete 14 point review of systems was taken and is negative unless stated in the history above.    Physical Exam:   Blood pressure 112/75, pulse 90, height 1.676 m (5' 5.98\"), weight 82.7 kg (182 lb 6.4 oz), last menstrual period " 04/20/2018.    General: well appearing, no acute distress, pleasant and conversant,   Mental Status/neuro: alert and oriented  Face: symmetrical, normal facial color  Eyes: anicteric, EOMI   Visual field: grossly intact  Neck: suppler, no lymphadenopathy  Thyroid: normal size and texture, no nodule palpable  Breast: galactorrhea (left>right)  Heart: regular rhythm, normal S1S2, no murmur appreciated  Lung: clear to auscultation bilaterally  Legs: no swelling or edema  Neuro: 2+ patellar reflexes bilaterally    Labs : I reviewed data from epic and extract and summarize the pertinent data here.    1/9/2018 19:00 1/9/2018 19:01 1/12/2018 07:30   Prolactin  47 (H) 47   T4 Free  0.79    TSH  3.08    FSH  5.0    Cortisol Serum 3.5 (L)  11.6   Adrenal Corticotropin  51 (H) 65 (H)   Ins Growth Factor 1  210    Lutropin  4.5      Lab Results   Component Value Date     05/07/2018      Lab Results   Component Value Date    POTASSIUM 3.7 05/07/2018     Lab Results   Component Value Date    CHLORIDE 108 05/07/2018     Lab Results   Component Value Date    LIZY 8.8 05/07/2018     Lab Results   Component Value Date    CO2 28 05/07/2018     Lab Results   Component Value Date    BUN 7 05/07/2018     Lab Results   Component Value Date    CR 0.77 05/07/2018     Lab Results   Component Value Date    GLC 91 05/07/2018     Lab Results   Component Value Date    TSH 3.08 01/09/2018     Lab Results   Component Value Date    T4 0.79 01/09/2018     No results found for: A1C    No results found for: IGF1  Lab Results   Component Value Date    LH 4.5 01/09/2018     Lab Results   Component Value Date    FSH 5.0 01/09/2018     No results found for: TESTOSTFREE  No results found for: ESTROGEN  Lab Results   Component Value Date    PROLACTIN 47 01/12/2018     No results found for: PROG    MRI Brain: I personally reviewed the original images and agree with the below reports.   Results for orders placed in visit on 12/30/17   MR Brain w/o & w  Contrast    Narrative Brain/ Pituitary MRI without and with contrast    History: Hyperprolactinemia (H); Hyperprolactinemia (H).  ICD-10: Hyperprolactinemia (H)    Comparison:  None     Technique: Axial diffusion and FLAIR images of the whole brain  obtained without intravenous contrast. Sagittal T1 and T2-weighted,  coronal T2-weighted, coronal T1-weighted images with focus on the  sella were obtained without intravenous contrast. Post intravenous  contrast using gadolinium coronal and sagittal T1-weighted images were  obtained focused on the sella. Dynamic postcontrast coronal  T1-weighted images were also obtained.    Contrast: 9cc's Gadavist    Findings:    Well-defined rounded T1 and T2 high signal intensity mass measuring up  to 1.8 cm in maximum height is seen in the sella with suprasellar  extension. There is no significant gain in signal intensity post  contrast in the mass. There is contrast enhancement on the left  adjacent to the cystic mass which could represent residual pituitary  versus other etiology. The pituitary stalk is displaced to the left  and elevated. There is elevation and compression of the optic  chiasm.The major cavernous carotid vascular flow-voids appear patent.       FLAIR images through the whole brain are unremarkable, and demonstrate  no mass effect, midline shift, or significant enlargement of the  ventricles. There is polypoidal mucosal thickening noted involving  both maxillary sinuses. Otherwise paranasal sinuses, both orbits and  mastoids appear unremarkable      Impression Impression: T1 and T2 hyperintense sellar mass with suprasellar  extension measuring up to 1.8 cm in maximum height with mass effect on  the optic chiasm. There is adjacent enhancement on the left which  could represent residual pituitary gland versus other etiology. This  most likely represents a Rathke's cleft cyst although cannot exclude a  cystic craniopharyngioma and short-term follow-up is  "recommended.    I have personally reviewed the examination and initial interpretation  and I agree with the findings.    SHABBIR COLLIER MD       Pathology report 4/20/2018    For improved result formatting, select 'View Enhanced Report Format' under    Linked Documents section.     SPECIMEN(S):   A: No specimen rec'd from OR for A   B: Sellar cyst     FINAL DIAGNOSIS:   B. Pituitary fossa, designated \"Sellar cyst\", excision biopsy:      - PITUITARY ADENOMA, FRAGMENTS OF WELL-DIFFERENTIATED EPITHELIUM AND   FOCAL FOREIGN BODY GIANT CELLS, SEE   COMMENT     COMMENT:   The tissue predominantly encompasses a pituitary neoplasm that is   confirmed with synaptophysin immunostaining.   Additionally, the fragments of well-differentiated epithelium demonstrate   the presence of cystic remnants such   as a Rathke cleft cyst. Furthermore, the presence of foreign body giant   cells is unclear but may be suggestive   of a ruptured cyst. The case was reviewed with Pedro Tran,   neuropathologist who agrees with the above   diagnosis.     I have personally reviewed all specimens and/or slides, including the   listed special stains, and used them   with my medical judgement to determine or confirm the final diagnosis.     Electronically signed out by:     Leroy Mitchell M.D., [facility]     CLINICAL HISTORY:   Per the medical record, the patient is a 27 year old woman who presents   for management of a 1.8 cm cyst sellar   mass with optic nerve compression, as well as galactorrhea with an   elevated prolactin level. MRI  of the brain   in December 2017 identified the sellar mass with concern for mass effect   on the optic chiasm. She now   undergoes excision of the cystic sellar mass.   GROSS:   A: No specimen received to surgical pathology.     B:  The specimen is received in formalin with proper patient   identification, labeled \"sellar cyst\".  The   specimen consists of multiple red-brown soft tissue fragments 0.2-0.4 cm   in " greatest dimension.  The specimen   is placed in sponges and entirely submitted in cassette B1. (Dictated by:   Keara Frost [facility] 4/23/2018 12:09   PM)     MICROSCOPIC:   B. Examination discloses multiple fragments of tissue including a   pituitary neoplasm, a fragment of   adenohypophysis, dense fibroconnective tissue and small fragments of   well-differentiated epithelium.     The predominant portion of the specimen is a pituitary neoplasm with a   monomorphic population of cells with   abundant eosinophilic cytoplasm. Mitotic activity is inconspicuous.   Reticulin staining demonstrates loss of   pericellular reticulin in neoplastic cells. Immunostaining with   appropriate controls for synaptophysin and   CAM5.2 is diffusely positive in neoplastic cells. Immunostaining for   prolactin with appropriate controls shows   focal patchy positive staining in neoplastic cells.     Fragments of dense fibrocellular connective tissue is present with focal   foreign body giant cells containing   cholesterol clefts and focal hemosiderin. In other areas, islands of the   pituitary infiltrate the   fibroconnective tissue.     A few small fragments of well-differentiated epithelium are identified   near the edge of adenohypophysis, where   immunostaining for CK7 with appropriate controls is diffusely positive in   epithelial cells. Special staining   with PAS show no goblet cells.     CPT Codes:   B: 86684-MJ4, 26961-IIMIB, 18405-QKQ, 09860-JMI, 30029-CDB, 49136-TWFCZ.T,    94382-YQO.P, 36481-KHDS     Assessment and Plan  29 yo F with over one year hx of galactorrhea presents post 4/20/18 endoscopic transnasal resection of Rathke's cleft cyst found to be an adenoma on pathology. She is doing well post-op without any sodium derangements or symptoms concerning for pituitary dysfunction.     1. We discussed taper of hydrocortisone and that she should monitor for weakness/change in energy.   Taper:   - 5/8: 20mg qAM / 5mg pm  - 5/9:  10mg qAM/ 5mg pm  - 5/12: 5mg q AM/ 5 mg pm  - 5/15: 5mg qAM/ 0 mg in PM   - 5/18: off.     2. Return for bloodwork week of 5/21 - 5/25: Cortisol, TSH, free T4, prolactin ordered    Call clinic if feeling unwell or any further questions. Seen and staffed with Dr. Aldana.   Veronika Jaimes   PGY4 Med Peds [phone].      --- Addendum----  I saw the patient with resident Dr. Jaimes and directly examined patient and discussed. Agree above note and plan.   Still galactorrhea+    We spent 35 minutes with this patient face to face and explained the conditions and plans (more than 50% of time was counseling/coordination of care, taper plan of steroid, blood work of pituitary panel planned, explained symptoms of adrenal insufficiency) . The patient understood and is satisfied with today's visit. Return to clinic with me in 6 month.     Karolyn Aldana MD  Staff Physician  Endocrinology and Metabolism  [facility]  License: [id number]  Pager: [phone]

## 2018-05-24 ENCOUNTER — OFFICE VISIT (OUTPATIENT)
Dept: OTOLARYNGOLOGY | Facility: CLINIC | Age: 28
End: 2018-05-24
Payer: COMMERCIAL

## 2018-05-24 VITALS — HEIGHT: 66 IN | WEIGHT: 178 LBS | BODY MASS INDEX: 28.61 KG/M2

## 2018-05-24 DIAGNOSIS — D49.7 PITUITARY NEOPLASM: Primary | ICD-10-CM

## 2018-05-24 ASSESSMENT — PAIN SCALES - GENERAL: PAINLEVEL: NO PAIN (0)

## 2018-05-24 NOTE — LETTER
5/24/2018       RE: Aurea Prasad  [address] [facility] [address]     Dear Colleague,    Thank you for referring your patient, Aurea Prasad, to the [facility] EAR NOSE AND [facility]ROAT at [facility]. Please see a copy of my visit note below.    DIAGNOSIS:  Pituitary macroadenoma.      TREATMENT:  The patient underwent transnasal endoscopic resection of pituitary macroadenoma on 04/20/2018 and reconstruction with a right-sided nasoseptal flap.  There was an intraoperative CSF leak.  Pathology report shows pituitary adenoma.      HISTORY OF PRESENT ILLNESS:  Ms. Prasad is a 28-year-old female who is back today with concerns of a deviated septum to the left.  She is also complaining of some sinonasal congestion usually at night.  She is still with some degree of crusting coming out from her nose.      PHYSICAL EXAMINATION:    Constitutional: The patient was well-groomed and in no acute distress.    Skin: Warm and pink.    Neurologic: Alert and oriented x3. Cranial nerves III-XII within normal limits. Voice quality is normal.    Psychiatric: The patient's affect was calm, cooperative and appropriate.    Respiratory: Breathing comfortably without stridor or exertion of accessory muscles.    Eyes: Pupils were equal and reactive. Extraocular movements are intact.    Head: Normocephalic and atraumatic. No lesions or scars.    Ears: External auditory canals and tympanic membranes were clear.    Nose: Sinuses were nontender. Anterior rhinoscopy revealed midline septum and absence of purulence or polyps.    Oral cavity/Oropharynx: Normal tongue, floor of mouth, buccal mucosa and palate. No lesions or masses on inspection or palpation. No abnormal lymph tissue in the oropharynx.    Neck: The parotids are soft without masses. Supple with normal laryngeal and tracheal landmarks.    Lymphatic: There is no palpable lymphadenopathy or other masses in the neck or parotids.       PROCEDURE:  Nasal endoscopy: The risks and benefits of the procedure were discussed and written consent was obtained. A timeout was performed. The patient's nose was sprayed with lidocaine and Afrin, and a 0-degree nasal endoscope was used to gain access into the nasal cavity.  Septum is slightly deviated to the left but is not obstructing the passages.  Posterior septectomy is wide open.  There is some crusting that was removed with the suction.  The sphenoid sinus is wide open.  This nasoseptal flap has healed nicely.  On the right side, there is a portion of the anterior septum that is still healing.  The rotational flap is in place.  No evidence of CSF leak.  No evidence of purulence.      ASSESSMENT AND PLAN:  A 28-year-old female status post transnasal endoscopic resection of pituitary adenoma.  I explained to the patient that these findings are expected.  I do not think that there is a severe septal deviation. The deviation is minimal, and I think it was there before surgery.  This is not obstructing the nasal passages.  I will see her back in six weeks.           Again, thank you for allowing me to participate in the care of your patient.      Sincerely,    Mell Mccray MD

## 2018-05-24 NOTE — PROGRESS NOTES
DIAGNOSIS:  Pituitary macroadenoma.      TREATMENT:  The patient underwent transnasal endoscopic resection of pituitary macroadenoma on 04/20/2018 and reconstruction with a right-sided nasoseptal flap.  There was an intraoperative CSF leak.  Pathology report shows pituitary adenoma.      HISTORY OF PRESENT ILLNESS:  Ms. Prasad is a 28-year-old female who is back today with concerns of a deviated septum to the left.  She is also complaining of some sinonasal congestion usually at night.  She is still with some degree of crusting coming out from her nose.      PHYSICAL EXAMINATION:    Constitutional: The patient was well-groomed and in no acute distress.    Skin: Warm and pink.    Neurologic: Alert and oriented x3. Cranial nerves III-XII within normal limits. Voice quality is normal.    Psychiatric: The patient's affect was calm, cooperative and appropriate.    Respiratory: Breathing comfortably without stridor or exertion of accessory muscles.    Eyes: Pupils were equal and reactive. Extraocular movements are intact.    Head: Normocephalic and atraumatic. No lesions or scars.    Ears: External auditory canals and tympanic membranes were clear.    Nose: Sinuses were nontender. Anterior rhinoscopy revealed midline septum and absence of purulence or polyps.    Oral cavity/Oropharynx: Normal tongue, floor of mouth, buccal mucosa and palate. No lesions or masses on inspection or palpation. No abnormal lymph tissue in the oropharynx.    Neck: The parotids are soft without masses. Supple with normal laryngeal and tracheal landmarks.    Lymphatic: There is no palpable lymphadenopathy or other masses in the neck or parotids.       PROCEDURE: Nasal endoscopy: The risks and benefits of the procedure were discussed and written consent was obtained. A timeout was performed. The patient's nose was sprayed with lidocaine and Afrin, and a 0-degree nasal endoscope was used to gain access into the nasal cavity.  Septum is slightly  deviated to the left but is not obstructing the passages.  Posterior septectomy is wide open.  There is some crusting that was removed with the suction.  The sphenoid sinus is wide open.  This nasoseptal flap has healed nicely.  On the right side, there is a portion of the anterior septum that is still healing.  The rotational flap is in place.  No evidence of CSF leak.  No evidence of purulence.      ASSESSMENT AND PLAN:  A 28-year-old female status post transnasal endoscopic resection of pituitary adenoma.  I explained to the patient that these findings are expected.  I do not think that there is a severe septal deviation. The deviation is minimal, and I think it was there before surgery.  This is not obstructing the nasal passages.  I will see her back in six weeks.

## 2018-05-24 NOTE — PATIENT INSTRUCTIONS
1.  You were seen in the ENT Clinic today by Dr. Pritchett.  If you have any questions or concerns after your appointment, please call [phone].  Press option #1 for scheduling related needs.  Press option #3 for Nurse advice.  2.  Plan is to return to clinic in 6 weeks.

## 2018-05-24 NOTE — MR AVS SNAPSHOT
After Visit Summary   5/24/2018    Aurea Prasad    MRN: [id number]           Patient Information     Date Of Birth          1990        Visit Information        Provider Department      5/24/2018 1:00 PM Mell Mccray MD [facility] Ear Nose and Throat        Today's Diagnoses     Pituitary neoplasm    -  1      Care Instructions    1.  You were seen in the ENT Clinic today by Dr. Pritchett.  If you have any questions or concerns after your appointment, please call [phone].  Press option #1 for scheduling related needs.  Press option #3 for Nurse advice.  2.  Plan is to return to clinic in 6 weeks.                    Follow-ups after your visit        Your next 10 appointments already scheduled     Jul 12, 2018  5:20 PM CDT   (Arrive by 5:05 PM)   RETURN TUMOR VISIT with Mell Mccray MD   [facility] Ear Nose and Throat ([facility])    [address]  4th Floor  [place] [facility] [address] [phone]            Jul 17, 2018  7:00 AM CDT   MR BRAIN W/O & W CONTRAST with [id number]   [facility] MRI ([facility])    [address]  1st Floor  [facility] [address] [phone]           Take your medicines as usual, unless your doctor tells you not to. Bring a list of your current medicines to your exam (including vitamins, minerals and over-the-counter drugs).  You may or may not receive intravenous (IV) contrast for this exam pending the discretion of the Radiologist.  You do not need to do anything special to prepare.  The MRI machine uses a strong magnet. Please wear clothes without metal (snaps, zippers). A sweatsuit works well, or we may give you a hospital gown.  Please remove any body piercings and hair extensions before you arrive. You will also remove watches, jewelry, hairpins, wallets, dentures, partial dental plates and hearing aids. You may wear contact lenses, and you may be  able to wear your rings. We have a safe place to keep your personal items, but it is safer to leave them at home.  **IMPORTANT** THE INSTRUCTIONS BELOW ARE ONLY FOR THOSE PATIENTS WHO HAVE BEEN PRESCRIBED SEDATION OR GENERAL ANESTHESIA DURING THEIR MRI PROCEDURE:  IF YOUR DOCTOR PRESCRIBED ORAL SEDATION (take medicine to help you relax during your exam):   You must get the medicine from your doctor (oral medication) before you arrive. Bring the medicine to the exam. Do not take it at home. You ll be told when to take it upon arriving for your exam.   Arrive one hour early. Bring someone who can take you home after the test. Your medicine will make you sleepy. After the exam, you may not drive, take a bus or take a taxi by yourself.  IF YOUR DOCTOR PRESCRIBED IV SEDATION:   Arrive one hour early. Bring someone who can take you home after the test. Your medicine will make you sleepy. After the exam, you may not drive, take a bus or take a taxi by yourself.   No eating 6 hours before your exam. You may have clear liquids up until 4 hours before your exam. (Clear liquids include water, clear tea, black coffee and fruit juice without pulp.)  IF YOUR DOCTOR PRESCRIBED ANESTHESIA (be asleep for your exam):   Arrive 1 1/2 hours early. Bring someone who can take you home after the test. You may not drive, take a bus or take a taxi by yourself.   No eating 8 hours before your exam. You may have clear liquids up until 4 hours before your exam. (Clear liquids include water, clear tea, black coffee and fruit juice without pulp.)   You will spend four to five hours in the recovery room.  Please call the Imaging Department at your exam site with any questions.            Jul 17, 2018  8:30 AM CDT   (Arrive by 8:15 AM)   Return Visit with Maria Luisa Oshea MD   [facility] Neurosurgery ([facility])    [address] [address] [phone]              Who to  "contact     Please call your clinic at [phone] to:    Ask questions about your health    Make or cancel appointments    Discuss your medicines    Learn about your test results    Speak to your doctor            Additional Information About Your Visit        MyChart Information     [facility] is an electronic gateway that provides easy, online access to your medical records. With [facility], you can request a clinic appointment, read your test results, renew a prescription or communicate with your care team.     To sign up for [facility] visit the website at [email or web address]   You will be asked to enter the access code listed below, as well as some personal information. Please follow the directions to create your username and password.     Your access code is: [email or web address]  Expires: 2018  6:31 AM     Your access code will  in 90 days. If you need help or a new code, please contact your [facility] or call [phone] for assistance.        Care EveryWhere ID     This is your Care EveryWhere ID. This could be used by other organizations to access your [place] medical records  [id number]        Your Vitals Were     Height BMI (Body Mass Index)                1.676 m (5' 5.98\") 28.74 kg/m2           Blood Pressure from Last 3 Encounters:   18 112/75   18 101/59   18 120/82    Weight from Last 3 Encounters:   18 80.7 kg (178 lb)   18 82.7 kg (182 lb 6.4 oz)   18 81.6 kg (180 lb)              We Performed the Following     NASAL ENDOSCOPY, DIAGNOSTIC        Primary Care Provider Office Phone # Fax #    Clinic [facility] [phone]        [facility] [address]        Equal Access to Services     DONELL GARZON : Unruly Henriquez, adrianna beauchamp, elidia lopez. So [facility] [phone].    ATENCIÓN: Si habla español, tiene a cheek disposición " servicios gratuitos de asistencia lingüística. Law harley [phone].    We comply with applicable federal civil rights laws and [place] laws. We do not discriminate on the basis of race, color, national origin, age, disability, sex, sexual orientation, or gender identity.            Thank you!     Thank you for choosing [facility] EAR NOSE [facility] TH[facility]AT  for your care. Our goal is always to provide you with excellent care. Hearing back from our patients is one way we can continue to improve our services. Please take a few minutes to complete the written survey that you may receive in the mail after your visit with us. Thank you!             Your Updated Medication List - Protect others around you: Learn how to safely use, store and throw away your medicines at www.disposemymeds.org.          This list is accurate as of 5/24/18 11:59 PM.  Always use your most recent med list.                   Brand Name Dispense Instructions for use Diagnosis    * hydrocortisone 20 MG tablet    CORTEF    50 tablet    Take 1 tablet (20 mg) by mouth every morning    Rathke's cleft cyst (H)       * hydrocortisone 10 MG tablet    CORTEF    50 tablet    Take 1 tablet (10 mg) by mouth every evening    Rathke's cleft cyst (H)       Multi-vitamin Tabs tablet      Take 1 tablet by mouth daily        pantoprazole 40 MG EC tablet    PROTONIX    60 tablet    Take 1 tablet (40 mg) by mouth every morning (before breakfast)    Rathke's cleft cyst (H)       polyethylene glycol powder    MIRALAX    510 g    Take 17 g (1 capful) by mouth daily    Rathke's cleft cyst (H)       sodium chloride 0.65 % nasal spray    OCEAN NASAL SPRAY    2 Bottle    Spray 1 spray into both nostrils daily as needed for congestion    Rathke's cleft cyst (H)       * Notice:  This list has 2 medication(s) that are the same as other medications prescribed for you. Read the directions carefully, and ask your doctor or other care provider to review them with you.

## 2018-05-24 NOTE — NURSING NOTE
Chief Complaint   Patient presents with     RECHECK     rt sided nasal swelling     Bethany Bee Medical Assistant

## 2018-07-17 ENCOUNTER — RADIANT APPOINTMENT (OUTPATIENT)
Dept: MRI IMAGING | Facility: CLINIC | Age: 28
End: 2018-07-17
Attending: STUDENT IN AN ORGANIZED HEALTH CARE EDUCATION/TRAINING PROGRAM
Payer: COMMERCIAL

## 2018-07-17 ENCOUNTER — OFFICE VISIT (OUTPATIENT)
Dept: NEUROSURGERY | Facility: CLINIC | Age: 28
End: 2018-07-17
Payer: COMMERCIAL

## 2018-07-17 VITALS
HEART RATE: 89 BPM | DIASTOLIC BLOOD PRESSURE: 69 MMHG | SYSTOLIC BLOOD PRESSURE: 113 MMHG | BODY MASS INDEX: 28.91 KG/M2 | WEIGHT: 179.9 LBS | HEIGHT: 66 IN

## 2018-07-17 DIAGNOSIS — E23.6 RATHKE'S CLEFT CYST (H): ICD-10-CM

## 2018-07-17 DIAGNOSIS — D35.3 BENIGN NEOPLASM OF PITUITARY GLAND AND CRANIOPHARYNGEAL DUCT (POUCH) (H): Primary | ICD-10-CM

## 2018-07-17 DIAGNOSIS — N64.3 GALACTORRHEA: ICD-10-CM

## 2018-07-17 DIAGNOSIS — D35.2 BENIGN NEOPLASM OF PITUITARY GLAND AND CRANIOPHARYNGEAL DUCT (POUCH) (H): Primary | ICD-10-CM

## 2018-07-17 LAB
CORTIS SERPL-MCNC: 12.2 UG/DL (ref 4–22)
PROLACTIN SERPL-MCNC: 9 UG/L (ref 3–27)
T4 FREE SERPL-MCNC: 1.06 NG/DL (ref 0.76–1.46)
TSH SERPL DL<=0.005 MIU/L-ACNC: 3.42 MU/L (ref 0.4–4)

## 2018-07-17 RX ORDER — GADOBUTROL 604.72 MG/ML
7.5 INJECTION INTRAVENOUS ONCE
Status: COMPLETED | OUTPATIENT
Start: 2018-07-17 | End: 2018-07-17

## 2018-07-17 RX ADMIN — GADOBUTROL 7.5 ML: 604.72 INJECTION INTRAVENOUS at 07:31

## 2018-07-17 ASSESSMENT — PAIN SCALES - GENERAL: PAINLEVEL: NO PAIN (0)

## 2018-07-17 NOTE — MR AVS SNAPSHOT
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  Schedule an appointment with ENT doctor  Follow up in one year with Dr. Oshea with a MRI before the appointment.          Follow-ups after your visit        Follow-up notes from your care team     Return in about 1 year (around 2019).      Future tests that were ordered for you today     Open Future Orders        Priority Expected Expires Ordered    MRI Brain with & without gadolinium with pituitary protocol [MDT261] Routine  2019            Who to contact     Please call your clinic at [phone] to:    Ask questions about your health    Make or cancel appointments    Discuss your medicines    Learn about your test results    Speak to your doctor            Additional Information About Your Visit        MyChart Information     [facility] is an electronic gateway that provides easy, online access to your medical records. With [facility], you can request a clinic appointment, read your test results, renew a prescription or communicate with your care team.     To sign up for [facility] visit the website at [email or web address]   You will be asked to enter the access code listed below, as well as some personal information. Please follow the directions to create your username and password.     Your access code is: [email or web address]  Expires: 2018  6:31 AM     Your access code will  in 90 days. If you need help or a new code, please contact your [facility] or call [phone] for assistance.        Care  "EveryWhere ID     This is your Care EveryWhere ID. This could be used by other organizations to access your [place] medical records  [id number]        Your Vitals Were     Pulse Height BMI (Body Mass Index)             89 1.676 m (5' 5.98\") 29.05 kg/m2          Blood Pressure from Last 3 Encounters:   07/17/18 113/69   05/08/18 112/75   04/22/18 101/59    Weight from Last 3 Encounters:   07/17/18 81.6 kg (179 lb 14.4 oz)   05/24/18 80.7 kg (178 lb)   05/08/18 82.7 kg (182 lb 6.4 oz)               Primary Care Provider Office Phone # Fax #    Clinic [facility] [phone]       [address] [facility] [address]        Equal Access to Services     DONELL GARZON : Hadii damian pulido Sosaw, waaxda luqadaha, qaybta kaalmada jakob, elidia sierra . So [facility] [phone].    ATENCIÓN: Si habla español, tiene a cheek disposición servicios gratuitos de asistencia lingüística. Law al [phone].    We comply with applicable federal civil rights laws and [place] laws. We do not discriminate on the basis of race, color, national origin, age, disability, sex, sexual orientation, or gender identity.            Thank you!     Thank you for choosing [facility]  for your care. Our goal is always to provide you with excellent care. Hearing back from our patients is one way we can continue to improve our services. Please take a few minutes to complete the written survey that you may receive in the mail after your visit with us. Thank you!             Your Updated Medication List - Protect others around you: Learn how to safely use, store and throw away your medicines at www.disposemymeds.org.      Notice  As of 7/17/2018  9:02 AM    You have not been prescribed any medications.      "

## 2018-07-17 NOTE — DISCHARGE INSTRUCTIONS
MRI Contrast Discharge Instructions    The IV contrast you received today will pass out of your body in your  urine. This will happen in the next 24 hours. You will not feel this process.  Your urine will not change color.    Drink at least 4 extra glasses of water or juice today (unless your doctor  has restricted your fluids). This reduces the stress on your kidneys.  You may take your regular medicines.    If you are on dialysis: It is best to have dialysis today.    If you have a reaction: Most reactions happen right away. If you have  any new symptoms after leaving the hospital (such as hives or swelling),  call your hospital at the correct number below. Or call your family doctor.  If you have breathing distress or wheezing, call [phone].    Special instructions: ***    I have read and understand the above information.    Signature:______________________________________ Date:___________    Staff:__________________________________________ Date:___________     Time:__________    [place] Radiology Departments:    ___[place]: [phone]  ___[facility]: [phone]  ___[place]: [phone] ___[facility]: [phone]  ___[facility]: [phone]  ___[facility]: [phone]  ___[phone]  ___[facility]: [phone]  ___[phone]

## 2018-07-17 NOTE — LETTER
7/17/2018       RE: Aurea Prasad  [address] [facility] [address]     Dear Colleague,    Thank you for referring your patient, Aurea Prasad, to the [facility] NEUROSURGERY at [facility]. Please see a copy of my visit note below.    Dear Dr. Ha:    We saw Ms. Aurea Prasad back in Pituitary Clinic today for a post-operative follow-up. She underwent an endoscopic transnasal approach for resection of a cystic sellar mass. 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  As you know, this was discovered during a work-up of galactorrhea. She is recovering relatively well from this operation. She has residual sinus issues, mainly persistent crusting, which she is treating with sinus sprays and washes. She still has galactorrhea, but this is improving. This is still forced discharge but less frequently. She denies any headaches or visual changes. She has an IUD device without changes in her menstrual cycle.    On exam, her general appearance is good. Extraocular movements intact. She moves all extremities equally and with good coordination. The remainder of her gross neurological examination is normal.    REVIEW OF STUDIES: We reviewed her pre-operative and post-operative MRIs with her. This shows good decompression of the sella and the suprasellar space. The pituitary stalk is now visible and is slightly deviated to the left as is the pituitary gland. There are either surgical changes or residual cyst on the right side of the sella. There appears to be no compression on the pituitary stalk. The optic apparatus is also completely decompressed.    IMPRESSION AND PLAN: We went over her pathology results, that were a bit confusing. 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 We explained the distinction and consequences of this. These findings will not affect  our follow-up plan. We will plan on repeating an MRI in one year.    We will send her down to the lab on her way out today to re-check a prolactin level. Based on her imaging, we expect a normal prolactin level. Therefore, her persistent galactorrhea is confusing. We will await the lab results and contact her. Please do not hesitate to contact us with questions. We will keep you informed of her progress.    Her prolactin level returned at 9, which is normal.    BRI BABB MD    I, Luis Manuel Carmona, am serving as a scribe to document services personally performed by Bri Babb MD, based upon my observations and the provider's statements to me. All documentation has been reviewed by the aforementioned doctor prior to being entered into the official medical record.    I, Bri Babb, attest that above named individual is acting in scribe capacity, has observed my performance of the services and has documented them in accordance with my direction. The documentation recorded by the scribe accurately reflects the service I personally performed and the decisions made by me.

## 2018-07-17 NOTE — LETTER
7/17/2018      RE: Aurea Prasad  [address] [facility] [address]       Dear Dr. Ha:    We saw Ms. Aurea Prasad back in Pituitary Clinic today for a post-operative follow-up. She underwent an endoscopic transnasal approach for resection of a cystic sellar mass. 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  As you know, this was discovered during a work-up of galactorrhea. She is recovering relatively well from this operation. She has residual sinus issues, mainly persistent crusting, which she is treating with sinus sprays and washes. She still has galactorrhea, but this is improving. This is still forced discharge but less frequently. She denies any headaches or visual changes. She has an IUD device without changes in her menstrual cycle.    On exam, her general appearance is good. Extraocular movements intact. She moves all extremities equally and with good coordination. The remainder of her gross neurological examination is normal.    REVIEW OF STUDIES: We reviewed her pre-operative and post-operative MRIs with her. This shows good decompression of the sella and the suprasellar space. The pituitary stalk is now visible and is slightly deviated to the left as is the pituitary gland. There are either surgical changes or residual cyst on the right side of the sella. There appears to be no compression on the pituitary stalk. The optic apparatus is also completely decompressed.    IMPRESSION AND PLAN: We went over her pathology results, that were a bit confusing. 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 We explained the distinction and consequences of this. These findings will not affect our follow-up plan. We will plan on repeating an MRI in one year.    We will send her down to the lab on her way out today to re-check a prolactin level. Based on her imaging, we expect a normal  prolactin level. Therefore, her persistent galactorrhea is confusing. We will await the lab results and contact her. Please do not hesitate to contact us with questions. We will keep you informed of her progress.    Her prolactin level returned at 9, which is normal.    BRI BABB MD    I, Luis Manuel Carmona, am serving as a scribe to document services personally performed by Bri Babb MD, based upon my observations and the provider's statements to me. All documentation has been reviewed by the aforementioned doctor prior to being entered into the official medical record.    I, Bri Babb, attest that above named individual is acting in scribe capacity, has observed my performance of the services and has documented them in accordance with my direction. The documentation recorded by the scribe accurately reflects the service I personally performed and the decisions made by me.

## 2018-07-17 NOTE — PROGRESS NOTES
Dear Dr. Ha:    We saw Ms. Aurea Prasad back [facility] today for a post-operative follow-up. She underwent an endoscopic transnasal approach for resection of a cystic sellar mass. 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  As you know, this was discovered during a work-up of galactorrhea. She is recovering relatively well from this operation. She has residual sinus issues, mainly persistent crusting, which she is treating with sinus sprays and washes. She still has galactorrhea, but this is improving. This is still forced discharge but less frequently. She denies any headaches or visual changes. She has an IUD device without changes in her menstrual cycle.    On exam, her general appearance is good. Extraocular movements intact. She moves all extremities equally and with good coordination. The remainder of her gross neurological examination is normal.    REVIEW OF STUDIES: We reviewed her pre-operative and post-operative MRIs with her. This shows good decompression of the sella and the suprasellar space. The pituitary stalk is now visible and is slightly deviated to the left as is the pituitary gland. There are either surgical changes or residual cyst on the right side of the sella. There appears to be no compression on the pituitary stalk. The optic apparatus is also completely decompressed.    IMPRESSION AND PLAN: We went over her pathology results, that were a bit confusing. 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 We explained the distinction and consequences of this. These findings will not affect our follow-up plan. We will plan on repeating an MRI in one year.    We will send her down to the lab on her way out today to re-check a prolactin level. Based on her imaging, we expect a normal prolactin level. Therefore, her persistent galactorrhea is confusing. We will await the  lab results and contact her. Please do not hesitate to contact us with questions. We will keep you informed of her progress.    Her prolactin level returned at 9, which is normal.    BRI BABB MD    I, Luis Manuel Carmona, am serving as a scribe to document services personally performed by Bri Babb MD, based upon my observations and the provider's statements to me. All documentation has been reviewed by the aforementioned doctor prior to being entered into the official medical record.    I, Bri Babb, attest that above named individual is acting in scribe capacity, has observed my performance of the services and has documented them in accordance with my direction. The documentation recorded by the scribe accurately reflects the service I personally performed and the decisions made by me.

## 2018-07-17 NOTE — PATIENT INSTRUCTIONS
Have labs drawn before you leave clinic today.  Schedule an appointment with ENT doctor  Follow up in one year with Dr. Oshea with a MRI before the appointment.

## 2019-05-09 ENCOUNTER — PRE VISIT (OUTPATIENT)
Dept: NEUROSURGERY | Facility: CLINIC | Age: 29
End: 2019-05-09

## 2019-05-21 ENCOUNTER — ANCILLARY PROCEDURE (OUTPATIENT)
Dept: MRI IMAGING | Facility: CLINIC | Age: 29
End: 2019-05-21
Attending: NEUROLOGICAL SURGERY
Payer: COMMERCIAL

## 2019-05-21 DIAGNOSIS — D35.3 BENIGN NEOPLASM OF PITUITARY GLAND AND CRANIOPHARYNGEAL DUCT (POUCH) (H): ICD-10-CM

## 2019-05-21 DIAGNOSIS — D35.2 BENIGN NEOPLASM OF PITUITARY GLAND AND CRANIOPHARYNGEAL DUCT (POUCH) (H): ICD-10-CM

## 2019-05-21 RX ORDER — GADOBUTROL 604.72 MG/ML
7.5 INJECTION INTRAVENOUS ONCE
Status: COMPLETED | OUTPATIENT
Start: 2019-05-21 | End: 2019-05-21

## 2019-05-21 RX ADMIN — GADOBUTROL 7.5 ML: 604.72 INJECTION INTRAVENOUS at 07:08

## 2019-05-24 ENCOUNTER — DOCUMENTATION ONLY (OUTPATIENT)
Dept: NEUROSURGERY | Facility: CLINIC | Age: 29
End: 2019-05-24

## 2019-05-24 NOTE — PROGRESS NOTES
Called patient to discuss recent MRI. No evidence of growth of the residual pituitary  lesion. Patient feels well - regular menstrual cycle and no galactorrhea. Will repeat MRI in one year.  HYUN Oshea MD

## 2024-05-09 NOTE — PROGRESS NOTES
January 12, 2018            Christiano Ha MD   [address]   [place] [address]      RE:  Aurea Prasad      Dear Dr. Ha:      We saw Ms. Prasad in [facility] today for evaluation of a cystic sellar mass.  We have reviewed your notes, and you and I have already discussed her case.  In summary, she is a 27-year-old right-handed woman who presented to you with about 1 year of galactorrhea bilaterally.  You have already detailed these symptoms in your note.  An elevated prolactin level was found which led to an MRI.  This MRI was completed recently and showed a cystic mass with mild mass effect on the optic apparatus.  Her galactorrhea is bilateral and must be expressed.  She had a 5-month interval with no period but then it resumed this month.  She denies any headaches or visual changes.  Her other symptom is fatigue.      Past Medical History:     1.  Closed head injury at age 8 following a sledding collision.     2.  She was hospitalized for dehydration secondary to gastroenteritis a few years ago.   3.  [place] teeth extraction.      Family History:  There is no known history of intracranial tumors.      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  She is single and has no children.  She lives with her brother.  She does not smoke.  She has a few alcohol drinks every week.  According to your note, she has used cocaine in the past.      On exam, her general appearance is healthy.  She is obese with a BMI of 31.5.  Blood pressure is 118/73, heart rate 57.  She appears fatigued but has a normal affect.  Her speech and language are normal.  Extraocular movements are normal.  There is no ptosis.  Pupils are equal and reactive.  Face is symmetric.  She moves all extremities with good strength and coordination.      Review of Studies:  We went over her MRI with her.  She has a nonenhancing cystic  mass in the sella with suprasellar extension.  There is contact with the optic chiasm, but there does not appear to be compression.  The pituitary gland appears displaced superiorly and to the left.  The mass is hyperintense on T1 and the FLAIR sequence.  There is expansion of the floor of the sella.  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  A cystic pituitary adenoma is the other possibility.  Her repeat prolactin level was 47.  Her evening cortisol level was 3.5, and her daytime cortisol level was in the reference range at 11.6.      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  Her mild hyperprolactinemia is secondary to compression of the pituitary stalk.  While the hyperprolactinemia can be treated medically, we should give consideration to surgical drainage/resection of this cyst.      She is young, and with the expected growth of this cyst, we can expect sheyla compression of the optic apparatus eventually.  Therefore, we believe that surgical decompression is best.  This will likely also relieve the hyperprolactinemia.  In the absence of other symptoms, surgery can be performed at any time.  She is scheduled for formal visual testing in about 3 weeks, and we will await those results.  In the meantime, we will go ahead and schedule her for the endoscopic transnasal approach.  We went over the details of surgery including the risks of carotid artery injury, CSF leak, infection, hemorrhage and panhypopituitarism.  Given her young age, our approach will be conservative, and we will focus on drainage of this cyst with resection of the readily accessible parts of the cyst wall.  However, conservative approach increases the risk of cyst recurrence and need for reoperation.  She is aware of all of this and is leaning towards surgery.  We will arrange for her to meet with one of our [facility] partners, and we will begin making arrangements.        We will keep you informed of her progress.         BRI BABB MD             D: 2018 13:27   T: 01/15/2018 08:41   MT: [id number]      Name:     SIERRA BOWER   MRN:      [id number]        Account:      [id number]   :      1990           Service Date: 2018      Document: [id number]     no

## (undated) DEVICE — SOL WATER IRRIG 1000ML BOTTLE 2F7114

## (undated) DEVICE — ESU SUCTION CAUTERY 10FR FOOT CONTROL E2505-10FR

## (undated) DEVICE — EYE PREP BETADINE 5% SOLUTION 30ML 0065-0411-30

## (undated) DEVICE — CATH TRAY FOLEY SURESTEP 16FR W/TMP PRB STLK LATEX A319416AM

## (undated) DEVICE — PACK NEURO MINOR UMMC SNE32MNMU4

## (undated) DEVICE — SYR 03ML LL W/O NDL 309657

## (undated) DEVICE — BLADE KNIFE SURG 11 371111

## (undated) DEVICE — RX BACITRACIN OINTMENT 0.9G 1/32OZ CUR001109

## (undated) DEVICE — ENDO SHEATH STORZ SHARPSITE ENDOSCRUB 30DEG 4MM 1912010

## (undated) DEVICE — SOL NACL 0.9% IRRIG 1000ML BOTTLE 07138-09

## (undated) DEVICE — BUR BALL 3MM DIAMOND 15CM ANSPACH MA15-3SD

## (undated) DEVICE — COVER CAMERA VIDEO LASER 9X96" 04-CC227

## (undated) DEVICE — SPONGE COTTONOID 1/2X1/2" 20-04S

## (undated) DEVICE — LINEN TOWEL PACK X30 5481

## (undated) DEVICE — NDL 25GA 2"  8881200441

## (undated) DEVICE — SUCTION MANIFOLD DORNOCH ULTRA CART UL-CL500

## (undated) DEVICE — SPECIMEN TRAP MUCOUS 40ML LUKI C30200A

## (undated) DEVICE — DRAPE POUCH INSTRUMENT 1018

## (undated) DEVICE — PIN SKULL MAYFIELD ADULT TITANIUM 3/PK A1120

## (undated) DEVICE — SU CHROMIC 4-0 RB-1 27" U203H

## (undated) DEVICE — MARKER SPHERES PASSIVE MEDT PACK 5 8801075

## (undated) DEVICE — GLOVE PROTEXIS MICRO 7.0  2D73PM70

## (undated) DEVICE — SPONGE SURGIFOAM 100 1974

## (undated) DEVICE — TUBING IRRIGATOR STRAIGHTSHOT XPS 1895522

## (undated) DEVICE — SPLINT NASAL DOYLE BREATHEASY 20-10500

## (undated) DEVICE — ESU MINI EPIDURAL VEIN SEALER AQUAMANTIS 3.4MM 23-314-1

## (undated) DEVICE — BUR BALL 4MM DIAMOND 15CM ANSPACH MA15-4D

## (undated) DEVICE — ESU ELEC NDL 6" COATED/INSULATED E1465-6

## (undated) DEVICE — ESU GROUND PAD ADULT W/CORD E7507

## (undated) DEVICE — DRAPE WARMER 66X44" ORS-300

## (undated) DEVICE — DRSG TELFA 3X8" 1238

## (undated) DEVICE — APPLICATOR EXTENDED TIP DURASEAL 8CM 205108

## (undated) DEVICE — SU ETHILON 2-0 FS 18" 664H

## (undated) DEVICE — ENDO SHEATH STORZ SHARPSITE ENDOSCRUB 0DEG 4MM 1912000

## (undated) DEVICE — SOL RINGERS LACTATED 1000ML BAG 07953-09

## (undated) DEVICE — SPONGE COTTONOID 1/2X3" 20-07S

## (undated) DEVICE — BUR BALL 3MM FLUTED 15CM ANSPACH MA15-3SB

## (undated) DEVICE — LINEN TOWEL PACK X6 WHITE 5487

## (undated) RX ORDER — ONDANSETRON 2 MG/ML
INJECTION INTRAMUSCULAR; INTRAVENOUS
Status: DISPENSED
Start: 2018-04-20

## (undated) RX ORDER — HYDROMORPHONE HYDROCHLORIDE 1 MG/ML
INJECTION, SOLUTION INTRAMUSCULAR; INTRAVENOUS; SUBCUTANEOUS
Status: DISPENSED
Start: 2018-04-20

## (undated) RX ORDER — PROPOFOL 10 MG/ML
INJECTION, EMULSION INTRAVENOUS
Status: DISPENSED
Start: 2018-04-20

## (undated) RX ORDER — ACETAMINOPHEN 325 MG/1
TABLET ORAL
Status: DISPENSED
Start: 2018-04-20

## (undated) RX ORDER — SODIUM CHLORIDE 9 MG/ML
INJECTION, SOLUTION INTRAVENOUS
Status: DISPENSED
Start: 2018-04-20

## (undated) RX ORDER — GABAPENTIN 300 MG/1
CAPSULE ORAL
Status: DISPENSED
Start: 2018-04-20

## (undated) RX ORDER — DEXAMETHASONE SODIUM PHOSPHATE 4 MG/ML
INJECTION, SOLUTION INTRA-ARTICULAR; INTRALESIONAL; INTRAMUSCULAR; INTRAVENOUS; SOFT TISSUE
Status: DISPENSED
Start: 2018-04-20

## (undated) RX ORDER — GLYCOPYRROLATE 0.2 MG/ML
INJECTION, SOLUTION INTRAMUSCULAR; INTRAVENOUS
Status: DISPENSED
Start: 2018-04-20

## (undated) RX ORDER — OXYMETAZOLINE HYDROCHLORIDE 0.05 G/100ML
SPRAY NASAL
Status: DISPENSED
Start: 2018-04-20

## (undated) RX ORDER — BACITRACIN 500 [USP'U]/G
OINTMENT OPHTHALMIC
Status: DISPENSED
Start: 2018-04-20

## (undated) RX ORDER — LIDOCAINE HYDROCHLORIDE 20 MG/ML
INJECTION, SOLUTION EPIDURAL; INFILTRATION; INTRACAUDAL; PERINEURAL
Status: DISPENSED
Start: 2018-04-20

## (undated) RX ORDER — CEFAZOLIN SODIUM 2 G/100ML
INJECTION, SOLUTION INTRAVENOUS
Status: DISPENSED
Start: 2018-04-20

## (undated) RX ORDER — FENTANYL CITRATE 50 UG/ML
INJECTION, SOLUTION INTRAMUSCULAR; INTRAVENOUS
Status: DISPENSED
Start: 2018-04-20

## (undated) RX ORDER — ESMOLOL HYDROCHLORIDE 10 MG/ML
INJECTION INTRAVENOUS
Status: DISPENSED
Start: 2018-04-20

## (undated) RX ORDER — SCOLOPAMINE TRANSDERMAL SYSTEM 1 MG/1
PATCH, EXTENDED RELEASE TRANSDERMAL
Status: DISPENSED
Start: 2018-04-20

## (undated) RX ORDER — LIDOCAINE HYDROCHLORIDE AND EPINEPHRINE 10; 10 MG/ML; UG/ML
INJECTION, SOLUTION INFILTRATION; PERINEURAL
Status: DISPENSED
Start: 2018-04-20